# Patient Record
Sex: MALE | Race: BLACK OR AFRICAN AMERICAN | Employment: OTHER | URBAN - METROPOLITAN AREA
[De-identification: names, ages, dates, MRNs, and addresses within clinical notes are randomized per-mention and may not be internally consistent; named-entity substitution may affect disease eponyms.]

---

## 2018-06-19 RX ORDER — TRIAMTERENE/HYDROCHLOROTHIAZID 37.5-25 MG
1 TABLET ORAL DAILY
COMMUNITY

## 2018-06-19 RX ORDER — LANOLIN ALCOHOL/MO/W.PET/CERES
1000 CREAM (GRAM) TOPICAL DAILY
COMMUNITY

## 2018-06-19 RX ORDER — AMLODIPINE BESYLATE 5 MG/1
5 TABLET ORAL DAILY
COMMUNITY

## 2018-06-19 RX ORDER — ACETAMINOPHEN 500 MG
2000 TABLET ORAL DAILY
COMMUNITY

## 2018-06-19 RX ORDER — METFORMIN HYDROCHLORIDE 500 MG/1
1000 TABLET, EXTENDED RELEASE ORAL 2 TIMES DAILY
COMMUNITY

## 2018-06-19 RX ORDER — LISINOPRIL 20 MG/1
20 TABLET ORAL DAILY
COMMUNITY

## 2018-06-20 ENCOUNTER — ANESTHESIA (OUTPATIENT)
Dept: ENDOSCOPY | Age: 68
End: 2018-06-20
Payer: MEDICARE

## 2018-06-20 ENCOUNTER — HOSPITAL ENCOUNTER (OUTPATIENT)
Age: 68
Setting detail: OUTPATIENT SURGERY
Discharge: HOME | End: 2018-06-20
Attending: INTERNAL MEDICINE | Admitting: INTERNAL MEDICINE
Payer: MEDICARE

## 2018-06-20 ENCOUNTER — ANESTHESIA EVENT (OUTPATIENT)
Dept: ENDOSCOPY | Age: 68
End: 2018-06-20
Payer: MEDICARE

## 2018-06-20 VITALS
SYSTOLIC BLOOD PRESSURE: 140 MMHG | OXYGEN SATURATION: 99 % | HEART RATE: 63 BPM | RESPIRATION RATE: 13 BRPM | DIASTOLIC BLOOD PRESSURE: 72 MMHG | TEMPERATURE: 97.6 F

## 2018-06-20 PROCEDURE — 76040000019: Performed by: INTERNAL MEDICINE

## 2018-06-20 PROCEDURE — 77030009426 HC FCPS BIOP ENDOSC BSC -B: Performed by: INTERNAL MEDICINE

## 2018-06-20 PROCEDURE — 76060000031 HC ANESTHESIA FIRST 0.5 HR: Performed by: INTERNAL MEDICINE

## 2018-06-20 PROCEDURE — 77030011640 HC PAD GRND REM COVD -A: Performed by: INTERNAL MEDICINE

## 2018-06-20 PROCEDURE — 74011250636 HC RX REV CODE- 250/636

## 2018-06-20 PROCEDURE — 88305 TISSUE EXAM BY PATHOLOGIST: CPT | Performed by: INTERNAL MEDICINE

## 2018-06-20 PROCEDURE — 77030027957 HC TBNG IRR ENDOGTR BUSS -B: Performed by: INTERNAL MEDICINE

## 2018-06-20 PROCEDURE — 74011000250 HC RX REV CODE- 250

## 2018-06-20 RX ORDER — SODIUM CHLORIDE 9 MG/ML
100 INJECTION, SOLUTION INTRAVENOUS CONTINUOUS
Status: DISCONTINUED | OUTPATIENT
Start: 2018-06-20 | End: 2018-06-20 | Stop reason: HOSPADM

## 2018-06-20 RX ORDER — SODIUM CHLORIDE 0.9 % (FLUSH) 0.9 %
5-10 SYRINGE (ML) INJECTION AS NEEDED
Status: DISCONTINUED | OUTPATIENT
Start: 2018-06-20 | End: 2018-06-20 | Stop reason: HOSPADM

## 2018-06-20 RX ORDER — NALOXONE HYDROCHLORIDE 0.4 MG/ML
0.4 INJECTION, SOLUTION INTRAMUSCULAR; INTRAVENOUS; SUBCUTANEOUS
Status: DISCONTINUED | OUTPATIENT
Start: 2018-06-20 | End: 2018-06-20 | Stop reason: HOSPADM

## 2018-06-20 RX ORDER — PROPOFOL 10 MG/ML
INJECTION, EMULSION INTRAVENOUS AS NEEDED
Status: DISCONTINUED | OUTPATIENT
Start: 2018-06-20 | End: 2018-06-20 | Stop reason: HOSPADM

## 2018-06-20 RX ORDER — FLUMAZENIL 0.1 MG/ML
0.2 INJECTION INTRAVENOUS
Status: DISCONTINUED | OUTPATIENT
Start: 2018-06-20 | End: 2018-06-20 | Stop reason: HOSPADM

## 2018-06-20 RX ORDER — ATROPINE SULFATE 0.1 MG/ML
0.5 INJECTION INTRAVENOUS
Status: DISCONTINUED | OUTPATIENT
Start: 2018-06-20 | End: 2018-06-20 | Stop reason: HOSPADM

## 2018-06-20 RX ORDER — MIDAZOLAM HYDROCHLORIDE 1 MG/ML
.25-1 INJECTION, SOLUTION INTRAMUSCULAR; INTRAVENOUS
Status: DISCONTINUED | OUTPATIENT
Start: 2018-06-20 | End: 2018-06-20 | Stop reason: HOSPADM

## 2018-06-20 RX ORDER — SODIUM CHLORIDE 0.9 % (FLUSH) 0.9 %
5-10 SYRINGE (ML) INJECTION EVERY 8 HOURS
Status: DISCONTINUED | OUTPATIENT
Start: 2018-06-20 | End: 2018-06-20 | Stop reason: HOSPADM

## 2018-06-20 RX ORDER — FENTANYL CITRATE 50 UG/ML
200 INJECTION, SOLUTION INTRAMUSCULAR; INTRAVENOUS
Status: DISCONTINUED | OUTPATIENT
Start: 2018-06-20 | End: 2018-06-20 | Stop reason: HOSPADM

## 2018-06-20 RX ORDER — EPINEPHRINE 0.1 MG/ML
1 INJECTION INTRACARDIAC; INTRAVENOUS
Status: DISCONTINUED | OUTPATIENT
Start: 2018-06-20 | End: 2018-06-20 | Stop reason: HOSPADM

## 2018-06-20 RX ORDER — LIDOCAINE HYDROCHLORIDE 20 MG/ML
INJECTION, SOLUTION EPIDURAL; INFILTRATION; INTRACAUDAL; PERINEURAL AS NEEDED
Status: DISCONTINUED | OUTPATIENT
Start: 2018-06-20 | End: 2018-06-20 | Stop reason: HOSPADM

## 2018-06-20 RX ORDER — DEXTROMETHORPHAN/PSEUDOEPHED 2.5-7.5/.8
1.2 DROPS ORAL
Status: DISCONTINUED | OUTPATIENT
Start: 2018-06-20 | End: 2018-06-20 | Stop reason: HOSPADM

## 2018-06-20 RX ORDER — SODIUM CHLORIDE 9 MG/ML
INJECTION, SOLUTION INTRAVENOUS
Status: DISCONTINUED | OUTPATIENT
Start: 2018-06-20 | End: 2018-06-20 | Stop reason: HOSPADM

## 2018-06-20 RX ADMIN — SODIUM CHLORIDE: 9 INJECTION, SOLUTION INTRAVENOUS at 08:40

## 2018-06-20 RX ADMIN — PROPOFOL 50 MG: 10 INJECTION, EMULSION INTRAVENOUS at 08:48

## 2018-06-20 RX ADMIN — PROPOFOL 50 MG: 10 INJECTION, EMULSION INTRAVENOUS at 08:52

## 2018-06-20 RX ADMIN — PROPOFOL 50 MG: 10 INJECTION, EMULSION INTRAVENOUS at 08:46

## 2018-06-20 RX ADMIN — PROPOFOL 50 MG: 10 INJECTION, EMULSION INTRAVENOUS at 08:44

## 2018-06-20 RX ADMIN — LIDOCAINE HYDROCHLORIDE 40 MG: 20 INJECTION, SOLUTION EPIDURAL; INFILTRATION; INTRACAUDAL; PERINEURAL at 08:44

## 2018-06-20 RX ADMIN — PROPOFOL 50 MG: 10 INJECTION, EMULSION INTRAVENOUS at 08:55

## 2018-06-20 RX ADMIN — PROPOFOL 50 MG: 10 INJECTION, EMULSION INTRAVENOUS at 08:50

## 2018-06-20 NOTE — PROCEDURES
[address], [address] [place] [address]      Colonoscopy Operative Report    Ariane Kilpatrick  [id number]  1950      Procedure Type:   Colonoscopy with polypectomy (hot biopsy)     Indications:    Screening colonoscopy   Date of last colonoscopy: 10 years, Polyps  No    Pre-operative Diagnosis: see indication above    Post-operative Diagnosis:  See findings below    :  Mitzi Severs, MD      Referring Provider: Eric Davila MD      Sedation:  MAC anesthesia Propofol      Procedure Details:  After informed consent was obtained with all risks and benefits of procedure explained and preoperative exam completed, the patient was taken to the endoscopy suite and placed in the left lateral decubitus position. Upon sequential sedation as per above, a digital rectal exam was performed demonstrating internal hemorrhoids. The Olympus videocolonoscope  was inserted in the rectum and carefully advanced to the cecum, which was identified by the ileocecal valve and appendiceal orifice. The cecum was identified by the ileocecal valve and appendiceal orifice. The quality of preparation was good. The colonoscope was slowly withdrawn with careful evaluation between folds. Retroflexion in the rectum was completed . Findings:   Rectum: normal  Sigmoid: 9 mm polyp removed by hot biopsy  Descending Colon: normal  Transverse Colon: 9 mm polyp removed by hot biopsy  Ascending Colon: 9 mm polyp removed by hot biopsy  Cecum: normal        Specimen Removed:  see findings    Complications: None. EBL:  None. Impression:    see findings    Recommendations: --Await pathology.       Recommendation for next colonscopy in 3 years      Signed By: Mitzi Severs, MD     6/20/2018  9:01 AM

## 2018-06-20 NOTE — ROUTINE PROCESS
Hebert Heri  1950  [id number]    Situation:  Verbal report received from: [facility]  Procedure: Procedure(s):  COLONOSCOPY  ENDOSCOPIC POLYPECTOMY    Background:    Preoperative diagnosis: SCREENING  Postoperative diagnosis: 1. Colonic Polyps    :  Dr. Rodolfo Garrett  Assistant(s): Endoscopy Technician-1: Ottie Sandifer  Endoscopy RN-1: Nica Fernandez RN    Specimens:   ID Type Source Tests Collected by Time Destination   1 : Ascending Colon Polyp Preservative   Sonia Gilbert MD 6/20/2018 8666 Pathology   2 : Transverse Colon Polyp Preservative   Sonia Gilbert MD 6/20/2018 7155 Pathology   3 : Sigmoid Colon Polyp Preservative   Dexter Calles MD 6/20/2018 6564 Pathology     H. Pylori  no    Assessment:  Intra-procedure medications   Anesthesia gave intra-procedure sedation and medications, see anesthesia flow sheet yes    Intravenous fluids: NS@ KVO     Vital signs stable     Abdominal assessment: round and soft     Recommendation:  Discharge patient per MD order.   Family or Friend Friend  Permission to share finding with family or friend yes

## 2018-06-20 NOTE — ANESTHESIA PREPROCEDURE EVALUATION
Anesthetic History   No history of anesthetic complications            Review of Systems / Medical History  Patient summary reviewed, nursing notes reviewed and pertinent labs reviewed    Pulmonary  Within defined limits                 Neuro/Psych   Within defined limits           Cardiovascular    Hypertension: well controlled                   GI/Hepatic/Renal  Within defined limits              Endo/Other    Diabetes: type 2         Other Findings            Physical Exam    Airway  Mallampati: II  TM Distance: > 6 cm  Neck ROM: normal range of motion   Mouth opening: Normal     Cardiovascular  Regular rate and rhythm,  S1 and S2 normal,  no murmur, click, rub, or gallop             Dental  No notable dental hx       Pulmonary  Breath sounds clear to auscultation               Abdominal  GI exam deferred       Other Findings            Anesthetic Plan    ASA: 2  Anesthesia type: MAC          Induction: Intravenous  Anesthetic plan and risks discussed with: Patient

## 2018-06-20 NOTE — ANESTHESIA POSTPROCEDURE EVALUATION
Post-Anesthesia Evaluation and Assessment    Patient: Mor Cortez MRN: [id number]  SSN: [id number]    YOB: 1950  Age: 77 y.o. Sex: male       Cardiovascular Function/Vital Signs  Visit Vitals    /72    Pulse 63    Temp 36.4 °C (97.6 °F)    Resp 13    SpO2 99%       Patient is status post MAC anesthesia for Procedure(s):  COLONOSCOPY  ENDOSCOPIC POLYPECTOMY. Nausea/Vomiting: None    Postoperative hydration reviewed and adequate. Pain:  Pain Scale 1: Numeric (0 - 10) (06/20/18 0956)  Pain Intensity 1: 0 (06/20/18 0956)   Managed    Neurological Status: At baseline    Mental Status and Level of Consciousness: Arousable    Pulmonary Status:   O2 Device: Room air (06/20/18 0956)   Adequate oxygenation and airway patent    Complications related to anesthesia: None    Post-anesthesia assessment completed.  No concerns    Signed By: Martínez Esparza MD     June 20, 2018

## 2018-06-20 NOTE — IP AVS SNAPSHOT
[address] 
[phone] Patient: Joanna Avilez MRN: [id number] BES:9/59/5014 About your hospitalization You were admitted on:  June 20, 2018 You last received care in the:  [facility] ENDOSCOPY You were discharged on:  June 20, 2018 Why you were hospitalized Your primary diagnosis was:  Not on File Follow-up Information None Discharge Orders None A check parish indicates which time of day the medication should be taken. My Medications CONTINUE taking these medications Instructions Each Dose to Equal  
 Morning Noon Evening Bedtime  
 amLODIPine 5 mg tablet Commonly known as:  Freeman Bars Your last dose was: Your next dose is: Take 5 mg by mouth daily. 5 mg ASPIRIN PO Your last dose was: Your next dose is: Take 81 mg by mouth daily. 81 mg Cholecalciferol (Vitamin D3) 2,000 unit Cap capsule Commonly known as:  VITAMIN D3 Your last dose was: Your next dose is: Take 2,000 Units by mouth daily. 2000 Units FLOMAX 0.4 mg capsule Generic drug:  tamsulosin Your last dose was: Your next dose is: Take 0.4 mg by mouth daily. 0.4 mg  
    
   
   
   
  
 glipiZIDE 5 mg tablet Commonly known as:  Gutierrez Half Your last dose was: Your next dose is: Take 5 mg by mouth two (2) times a day. 5 mg  
    
   
   
   
  
 glucophage  mg tablet Generic drug:  metFORMIN ER Your last dose was: Your next dose is: Take 1,000 mg by mouth two (2) times a day. 1000 mg  
    
   
   
   
  
 labetalol 300 mg tablet Commonly known as:  Payal Carlos Your last dose was: Your next dose is: Take 900 mg by mouth two (2) times a day. 900 mg  
    
   
   
   
  
 lisinopril 20 mg tablet Commonly known as:  Joshua Faustina Your last dose was: Your next dose is: Take 20 mg by mouth daily. 20 mg  
    
   
   
   
  
 lovastatin 20 mg tablet Commonly known as:  MEVACOR Your last dose was: Your next dose is: Take 20 mg by mouth nightly. 20 mg NEXIUM PO Your last dose was: Your next dose is: Take 22.5 mg by mouth daily. 22.5 mg  
    
   
   
   
  
 POTASSIUM CHLORIDE PO Your last dose was: Your next dose is: Take 20 mEq by mouth daily. ER micro  
 20 mEq  
    
   
   
   
  
 triamterene-hydroCHLOROthiazide 37.5-25 mg per tablet Commonly known as:  Adeline Marus Your last dose was: Your next dose is: Take 1 Tab by mouth daily. 1 Tab VITAMIN B-12 1,000 mcg tablet Generic drug:  cyanocobalamin Your last dose was: Your next dose is: Take 1,000 mcg by mouth daily. 1000 mcg Discharge Instructions [address] 
611 [facility], [address] [place] [address] COLON DISCHARGE INSTRUCTIONS César Reynoso [id number] 
1950 Discomfort: 
Redness at IV site- apply warm compress to area; if redness or soreness persist- contact your physician There may be a slight amount of blood passed from the rectum Gaseous discomfort- walking, belching will help relieve any discomfort You may not operate a vehicle for 12 hours You may not engage in an occupation involving machinery or appliances for rest of today You may not drink alcoholic beverages for at least 12 hours Avoid making any critical decisions for at least 24 hour DIET: 
You may resume your regular diet  however -  remember your colon is empty and a heavy meal will produce gas. Avoid these foods:  vegetables, fried / greasy foods, carbonated drinks ACTIVITY: 
You may  resume your normal daily activities it is recommended that you spend the remainder of the day resting -  avoid any strenuous activity. CALL M.D. ANY SIGN OF: Increasing pain, nausea, vomiting Abdominal distension (swelling) New increased bleeding (oral or rectal) Fever (chills) Pain in chest area Bloody discharge from nose or mouth Shortness of breath Follow-up Instructions: 
 Call Dr. Donovan Laureano for any questions or problems at [phone] ENDOSCOPY FINDINGS: 
 Your colonoscopy showed 3 small polyps I removed. Telephone # [phone] Signed By: Donovan Laureano MD   
 6/20/2018  9:04 AM 
  
 
DISCHARGE SUMMARY from Nurse The following personal items collected during your admission are returned to you:  
Dental Appliance: Dental Appliances: None Vision: Visual Aid: Glasses Hearing Aid:   
Jewelry:   
Clothing:   
Other Valuables:   
Valuables sent to safe:   
 
 
 
  
  
  
Introducing [facility]! [facility] introduces Bangbite patient portal. Now you can access parts of your medical record, email your doctor's office, and request medication refills online. 1. In your internet browser, go to [email or web address]. [employer] 2. Click on the First Time User? Click Here link in the Sign In box. You will see the New Member Sign Up page. 3. Enter your Bangbite Access Code exactly as it appears below. You will not need to use this code after youve completed the sign-up process. If you do not sign up before the expiration date, you must request a new code. · Bangbite Access Code: [email or web address] Expires: 9/18/2018  9:11 AM 
 
4. Enter the last four digits of your Social Security Number (xxxx) and Date of Birth (mm/dd/yyyy) as indicated and click Submit. You will be taken to the next sign-up page. 5. Create a G[employer]x ID. This will be your [employer] login ID and cannot be changed, so think of one that is secure and easy to remember. 6. Create a [employer] password. You can change your password at any time. 7. Enter your Password Reset Question and Answer. This can be used at a later time if you forget your password. 8. Enter your e-mail address. You will receive e-mail notification when new information is available in [address] 9. Click Sign Up. You can now view and download portions of your medical record. 10. Click the Download Summary menu link to download a portable copy of your medical information. If you have questions, please visit the Frequently Asked Questions section of the [employer] website. Remember, [employer]x is NOT to be used for urgent needs. For medical emergencies, dial 911. Now available from your iPhone and Android! Introducing Moy Wade As a [facility] patient, I wanted to make you aware of our electronic visit tool called Moy Wade. [facility] 24/7 allows you to connect within minutes with a medical provider 24 hours a day, seven days a week via a mobile device or tablet or logging into a secure website from your computer. You can access Moy Wade from anywhere in the [place]. A virtual visit might be right for you when you have a simple condition and feel like you just dont want to get out of bed, or cant get away from work for an appointment, when your regular [facility] provider is not available (evenings, weekends or holidays), or when youre out of town and need minor care. Electronic visits cost only $49 and if the [facility] 24/7 provider determines a prescription is needed to treat your condition, one can be electronically transmitted to a nearby pharmacy*. Please take a moment to enroll today if you have not already done so. The enrollment process is free and takes just a few minutes.   To enroll, please download the [facility] 24/7 cordell to your tablet or phone, or visit [email or web address] to enroll on your computer. And, as an [address] patient with a elmenus account, the results of your visits will be scanned into your electronic medical record and your primary care provider will be able to view the scanned results. We urge you to continue to see your regular [employer] provider for your ongoing medical care. And while your primary care provider may not be the one available when you seek a [employer]In[employer] virtual visit, the peace of mind you get from getting a real diagnosis real time can be priceless. For more information on PowerInbox, view our Frequently Asked Questions (FAQs) at [email or web address]. Sincerely, 
 
Sheba Chowdary MD 
Chief Medical Officer [facility] Trudi Ritchie *:  certain medications cannot be prescribed via PowerInbox Providers Seen During Your Hospitalization Provider Specialty Primary office phone Bekah Aviles MD Gastroenterology [phone] Your Primary Care Physician (PCP) Primary Care Physician Office Phone Office Fax Felipa HEBERT [phone] You are allergic to the following No active allergies Recent Documentation Smoking Status Former Smoker Emergency Contacts Name Discharge Info Relation Home Work Mobile Cristobal Sam DISCHARGE CAREGIVER [3] Other Relative [6] [phone] Patient Belongings The following personal items are in your possession at time of discharge: 
  Dental Appliances: None  Visual Aid: Glasses Please provide this summary of care documentation to your next provider. Signatures-by signing, you are acknowledging that this After Visit Summary has been reviewed with you and you have received a copy. Patient Signature:  ____________________________________________________________ Date:  ____________________________________________________________  
  
Arna [place] Provider Signature:  ____________________________________________________________ Date:  ____________________________________________________________

## 2018-06-20 NOTE — H&P
[address]  174 [facility], [address] [place] [address]      History and Physical       NAME:  Dionne Zaragoza   :   1950   MRN:   [id number]             History of Present Illness:  Patient is a 77 y.o. who is seen for screening colonoscopy. 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 Taking? ASPIRIN PO 2018 at Unknown time  Yes Yes   Sig: Take 81 mg by mouth daily. Cholecalciferol, Vitamin D3, (VITAMIN D3) 2,000 unit cap capsule 2018 at Unknown time  Yes Yes   Sig: Take 2,000 Units by mouth daily. POTASSIUM CHLORIDE PO 2018 at Unknown time  Yes Yes   Sig: Take 20 mEq by mouth daily. ER micro   amLODIPine (NORVASC) 5 mg tablet 2018 at Unknown time  Yes Yes   Sig: Take 5 mg by mouth daily. cyanocobalamin (VITAMIN B-12) 1,000 mcg tablet 2018 at Unknown time  Yes Yes   Sig: Take 1,000 mcg by mouth daily. esomeprazole magnesium (NEXIUM PO) 2018 at Unknown time  Yes Yes   Sig: Take 22.5 mg by mouth daily. glipiZIDE (GLUCOTROL) 5 mg tablet 2018 at Unknown time Self Yes Yes   Sig: Take 5 mg by mouth two (2) times a day. labetalol (NORMODYNE) 300 mg tablet 2018 at Unknown time Self Yes Yes   Sig: Take 900 mg by mouth two (2) times a day. lisinopril (PRINIVIL, ZESTRIL) 20 mg tablet 2018 at Unknown time  Yes Yes   Sig: Take 20 mg by mouth daily. lovastatin (MEVACOR) 20 mg tablet 2018 at Unknown time  Yes Yes   Sig: Take 20 mg by mouth nightly. metFORMIN ER (GLUCOPHAGE XR) 500 mg tablet 2018 at Unknown time  Yes Yes   Sig: Take 1,000 mg by mouth two (2) times a day.    tamsulosin (FLOMAX) 0.4 mg capsule 6/19/2018 at Unknown time  Yes Yes   Sig: Take 0.4 mg by mouth daily. triamterene-hydroCHLOROthiazide (MAXZIDE) 37.5-25 mg per tablet 6/20/2018 at Unknown time  Yes Yes   Sig: Take 1 Tab by mouth daily.       Facility-Administered Medications: None       Hospital Medications:  Current Facility-Administered Medications   Medication Dose Route Frequency    0.9% sodium chloride infusion  100 mL/hr IntraVENous CONTINUOUS    sodium chloride (NS) flush 5-10 mL  5-10 mL IntraVENous Q8H    sodium chloride (NS) flush 5-10 mL  5-10 mL IntraVENous PRN    midazolam (VERSED) injection 0.25-10 mg  0.25-10 mg IntraVENous Multiple    fentaNYL citrate (PF) injection 200 mcg  200 mcg IntraVENous Multiple    naloxone (NARCAN) injection 0.4 mg  0.4 mg IntraVENous Multiple    flumazenil (ROMAZICON) 0.1 mg/mL injection 0.2 mg  0.2 mg IntraVENous Multiple    simethicone (MYLICON) 92PP/8.2TP oral drops 80 mg  1.2 mL Oral Multiple    atropine injection 0.5 mg  0.5 mg IntraVENous ONCE PRN    EPINEPHrine (ADRENALIN) 0.1 mg/mL syringe 1 mg  1 mg Endoscopically ONCE PRN       Social History:  Social History   Substance Use Topics    Smoking status: Former Smoker    Smokeless tobacco: Never Used      Comment: quit 1981    Alcohol use No       Family History:  Family History   Problem Relation Age of Onset    Cancer Mother      uterine    Heart Disease Maternal Grandmother              Review of Systems:      Constitutional: negative fever, negative chills, negative weight loss  Eyes:   negative visual changes  ENT:   negative sore throat, tongue or lip swelling  Respiratory:  negative cough, negative dyspnea  Cards:  negative for chest pain, palpitations, lower extremity edema  GI:   See HPI  :  negative for frequency, dysuria  Integument:  negative for rash and pruritus  Heme:  negative for easy bruising and gum/nose bleeding  Musculoskel: negative for myalgias,  back pain and muscle weakness  Neuro: negative for headaches, dizziness, vertigo  Psych:  negative for feelings of anxiety, depression       Objective:   Patient Vitals for the past 8 hrs:   BP Temp Pulse Resp   06/20/18 0820 143/73 97.6 °F (36.4 °C) 67 15             EXAM:     NEURO-a&o   HEENT-wnl   LUNGS-clear    COR-regular rate and rhythym     ABD-soft , no tenderness, no rebound, good bs     EXT-no edema     Data Review     No results for input(s): WBC, HGB, HCT, PLT, HGBEXT, HCTEXT, PLTEXT in the last 72 hours. No results for input(s): NA, K, CL, CO2, BUN, CREA, GLU, PHOS, CA in the last 72 hours. No results for input(s): SGOT, GPT, AP, TBIL, TP, ALB, GLOB, GGT, AML, LPSE in the last 72 hours. No lab exists for component: AMYP, HLPSE  No results for input(s): INR, PTP, APTT in the last 72 hours. No lab exists for component: INREXT       Assessment:   · Screening colonoscopy   There is no problem list on file for this patient.         Plan:   · Endoscopic procedure with sedation     Signed By: Nj Pugh MD     6/20/2018  8:33 AM

## 2018-06-20 NOTE — DISCHARGE INSTRUCTIONS
[facility]    COLON DISCHARGE INSTRUCTIONS    Luzmaria Ny  [id number]  1950    Discomfort:  Redness at IV site- apply warm compress to area; if redness or soreness persist- contact your physician  There may be a slight amount of blood passed from the rectum  Gaseous discomfort- walking, belching will help relieve any discomfort  You may not operate a vehicle for 12 hours  You may not engage in an occupation involving machinery or appliances for rest of today  You may not drink alcoholic beverages for at least 12 hours  Avoid making any critical decisions for at least 24 hour  DIET:  You may resume your regular diet - however -  remember your colon is empty and a heavy meal will produce gas. Avoid these foods:  vegetables, fried / greasy foods, carbonated drinks     ACTIVITY:  You may  resume your normal daily activities it is recommended that you spend the remainder of the day resting -  avoid any strenuous activity. CALL M.D. ANY SIGN OF:   Increasing pain, nausea, vomiting  Abdominal distension (swelling)  New increased bleeding (oral or rectal)  Fever (chills)  Pain in chest area  Bloody discharge from nose or mouth  Shortness of breath      Follow-up Instructions:   Call Dr. Mars Diaz for any questions or problems at [address] [place] [address] St:   Your colonoscopy showed 3 small polyps I removed.   Telephone # [phone]      Signed By: Mars Diaz MD     6/20/2018  9:04 AM       DISCHARGE SUMMARY from Nurse    The following personal items collected during your admission are returned to you:   Dental Appliance: Dental Appliances: None  Vision: Visual Aid: Glasses  Hearing Aid:    Jewelry:    Clothing:    Other Valuables:    Valuables sent to safe:

## 2018-06-20 NOTE — PROGRESS NOTES

## 2018-07-02 ENCOUNTER — HOSPITAL ENCOUNTER (OUTPATIENT)
Dept: PHYSICAL THERAPY | Age: 68
Discharge: HOME | End: 2018-07-02
Payer: MEDICARE

## 2018-07-02 PROCEDURE — 97110 THERAPEUTIC EXERCISES: CPT

## 2018-07-02 PROCEDURE — 97161 PT EVAL LOW COMPLEX 20 MIN: CPT

## 2018-07-02 NOTE — PROGRESS NOTES
[facility]  [address], [address] [facility]    OUTPATIENT physical Therapy  [x]      Initial Evaluation []     30 day/10th visit progress note []     90 day/re-certification    NAME: Aleyda Durham AGE: 77 y.o. GENDER: male  DATE: 7/2/2018  REFERRING PHYSICIAN: Jenna Love MD    OBJECTIVE DATA SUMMARY:   Medical Diagnosis: lumbar radiculopathy  PT Diagnosis: Pain affecting function, decreased ROM  Date of Onset: 3 weeks ago  Mechanism of Injury/Chief Complaint:  3 years ago similar injury but gradually got better. 3 weeks ago flare up. [facility], drove back and woke up with pain. Present Symptoms: Pt reports pain in right foot, right calf (grabing) back of thigh, into back.   Functional Deficits and Limitations:   []     Sitting:   []    Dressing:   []    Reaching:  [x]     Standing:   []     Bathing:   []    Lifting:  [x]     Walking:   []     Cooking:   []    Yardwork:  [x]     Sleeping:   []     Cleaning:   []     Driving:  []     Work Tasks:  []     Recreation:   []    Other:    HISTORY:  Past Medical History:   Past Medical History:   Diagnosis Date    Diabetes ([facility] [address]     High cholesterol     Hypertension      Past Surgical History:   Procedure Laterality Date    COLONOSCOPY N/A 6/20/2018    COLONOSCOPY performed by Natan Garcia MD at [facility]      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     Precautions: none  Current Medications:  Percocet 1/2  Prior Level of Function/Home Situation: Independent  Personal factors and/or comorbidities impacting plan of care: none  Social/Work History:  Retired  Previous Therapy:  none    SUBJECTIVE:   Pt reports grabbing pain in right low back, right thigh, calf and right foot  Patients goals for therapy: feel better    OBJECTIVE DATA SUMMARY:   EXAMINATION/PRESENTATION/DECISION MAKING:   Pain:  Location:  Quality: aching  Now: 6/10  Best:  Worst:  Factors that improve pain: rest sitting    Posture: WNL    Strength:   Right knee extension 3+/5, left knee ext 5/5    Range of Motion:   Lumbar   Ext 25 % of normal into left quadrant  Right side bending  25% of naormalpainful    Spinal Assessment:   Flattened lumbar spine      Joint Mobility:   Hypomobile lumbar spinal segments with P-A pressure    Palpation:   TTP right quadratus, lumbar paraspinals, gluteals, piriformis, gastroc    Neurologic Assessment:   Tone: WNL   Sensation: WNL   Reflexes: not tested, test next visit    Special Tests:   - SLR  + slump test on right    Mobility:   Transitional Movements: Antalgic   Gait: WNL    Balance: WNL    Functional Measure: In compliance with CMSs Claims Based Outcome Reporting, the following G-code set was chosen for this patient based on their primary functional limitation being treated: The outcome measure chosen to determine the severity of the functional limitation was the TXU Felipe with a score of 11/24 which was correlated with the impairment scale.     ? Mobility - Walking and Moving Around:     - CURRENT STATUS: CK - 40%-59% impaired, limited or restricted    - GOAL STATUS: CI - 1%-19% impaired, limited or restricted    - D/C STATUS:  ---------------To be determined---------------      Physical Therapy Evaluation Charge Determination   History Examination Presentation Decision-Making   LOW Complexity : Zero comorbidities / personal factors that will impact the outcome / POC LOW Complexity : 1-2 Standardized tests and measures addressing body structure, function, activity limitation and / or participation in recreation  LOW Complexity : Stable, uncomplicated  LOW Complexity : FOTO score of       Based on the above components, the patient evaluation is determined to be of the following complexity level: LOW     TREATMENT/INTERVENTION:  Modalities (Rationale):   MHP post treatment to decrease pain and muscle guarding    Therapeutic Exercises:  HEP   LTR, KTC, Bridges, HS stretch,  gastroc stretch      Manual Therapy:  None this date    Neuro Re-Education:  Discussed sitting with lumbar support to improve postural alignment    Balance Training:  none    Ambulation/Gait Training:  none    Activity tolerance and post treatment pain report: Tolerated well  Education:  []     Home exercise program provided. Education was provided to the patient on the following topics: importance of exercises. Patient verbalized understanding of the topics presented. ASSESSMENT:   Cb Tolentino is a 77 y.o. male who presents with right low back, hip and lower extremity pain. Physical therapy problems based on objective data include: pain affecting function, decrease ROM and decrease ADL/ functional abilitiies . Patient will benefit from skilled intervention to address these impairments. Rehabilitation potential is considered to be Excellent. Factors which may influence rehabilitation potential include deconditioning . Patient will benefit from 12 physical therapy visits over 6 weeks to optimize improvement in these areas. PLAN OF CARE:   Recommendations and Planned Interventions:  []     Therapeutic Activities  [x]     Heat/Ice  [x]     Therapeutic Exercises  []     Ultrasound  []     Gait training  [x]     E-stim  []     Balance training  [x]     Home exercise program  [x]     Manual Therapy  [x]     TENS  [x]     Neuro Re-Ed  []     Edema management  [x]     Posture/Biomechanics  [x]     Pain management  [x]     Traction  []     Other:    Frequency/Duration:  Patient will be followed by physical therapy 2 times a week for  6 weeks to address goals. GOALS  Short term goals  Time frame: 3 weeks  1. Patient will be compliance and independent with the initial HEP as evidenced by being able to perform without cuing. 2. Patient will report a 50% improvement in symptoms. 3. Patient report a 50% improvement in sleeping.   4. Patient will tolerate 15 minutes of clinic activities before onset of symptoms. Long term goals  Time frame: 6 weeks  1. Patient will reports pain level decreased to 2/10 to allow increased ease of movement. 2. Patient will have an improved score on the TXU Felipe outcome measure by 10 points to demonstrate an increase in functional activity tolerance. 3. Patient will be independent in final individualized HEP. 4. Patient will return to walking without being limited by symptoms. 5. Patient will sleep 6-8 hours without being interrupted by pain. [x]     Patient has participated in goal setting and agrees to work toward plan of care. []     Patient was instructed to call if questions or concerns arise. Thank you for this referral.  Ronnie Dennis, PT   Time Calculation: 55 mins    Patient Time in clinic:   Start Time: 1330   Stop Time: 7143    TREATMENT PLAN EFFECTIVE DATES:   7/2/2018 TO 9/3/2018  I have read the above plan of care for Aimee Brittle and certify the need for skilled physical therapy services.     Physician Signature: ____________________________________________________    Date: _________________________________________________________________

## 2018-07-09 ENCOUNTER — HOSPITAL ENCOUNTER (OUTPATIENT)
Dept: PHYSICAL THERAPY | Age: 68
Discharge: HOME | End: 2018-07-09
Payer: MEDICARE

## 2018-07-09 PROCEDURE — 97140 MANUAL THERAPY 1/> REGIONS: CPT

## 2018-07-09 PROCEDURE — 97110 THERAPEUTIC EXERCISES: CPT

## 2018-07-09 NOTE — PROGRESS NOTES
[address] OUTPATIENT physical Therapy DAILY Treatment NOTe Visit: 2 NAME: Zain Cm AGE: 77 y.o. GENDER: male DATE: 7/9/2018 REFERRING PHYSICIAN: Lulu Khalil MD 
 
 
 
GOALS Short term goals Time frame: 3 weeks 1. Patient will be compliance and independent with the initial HEP as evidenced by being able to perform without cuing. 2. Patient will report a 50% improvement in symptoms. 3. Patient report a 50% improvement in sleeping. 4. Patient will tolerate 15 minutes of clinic activities before onset of symptoms. Long term goals Time frame: 6 weeks 1. Patient will reports pain level decreased to 2/10 to allow increased ease of movement. 2. Patient will have an improved score on the TXU Felipe outcome measure by 10 points to demonstrate an increase in functional activity tolerance. 3. Patient will be independent in final individualized HEP. 4. Patient will return to walking without being limited by symptoms. 5. Patient will sleep 6-8 hours without being interrupted by pain. SUBJECTIVE:  
I feel a little better Pain In: 5/10 OBJECTIVE DATA SUMMARY:  
 
Pain: 
Location: 
Quality: aching Now: 6/10 Best: Worst: 
Factors that improve pain: rest sitting Posture: WNL Strength:  
Right knee extension 3+/5, left knee ext 5/5 Range of Motion:  
Lumbar Ext 25 % of normal into left quadrant Right side bending  25% of naormalpainful Spinal Assessment:  
Flattened lumbar spine Joint Mobility: Hypomobile lumbar spinal segments with P-A pressure Palpation:  
TTP right quadratus, lumbar paraspinals, gluteals, piriformis, gastroc Neurologic Assessment: 
 Tone: WNL Sensation: WNL Reflexes: not tested, test next visit Special Tests:  
- SLR 
+ slump test on right Mobility: 
 Transitional Movements: Antalgic Gait: WNL Balance: WNL Functional Measure: In compliance with CMSs Claims Based Outcome Reporting, the following G-code set was chosen for this patient based on their primary functional limitation being treated: The outcome measure chosen to determine the severity of the functional limitation was the TXU Felipe with a score of 11/24 which was correlated with the impairment scale. ? Mobility - Walking and Moving Around:  
  - CURRENT STATUS: CK - 40%-59% impaired, limited or restricted  - GOAL STATUS: CI - 1%-19% impaired, limited or restricted  - D/C STATUS:  ---------------To be determined--------------- Physical Therapy Evaluation Charge Determination History Examination Presentation Decision-Making LOW Complexity : Zero comorbidities / personal factors that will impact the outcome / POC LOW Complexity : 1-2 Standardized tests and measures addressing body structure, function, activity limitation and / or participation in recreation  LOW Complexity : Stable, uncomplicated  LOW Complexity : FOTO score of  Based on the above components, the patient evaluation is determined to be of the following complexity level: LOW  
 
TREATMENT/INTERVENTION: 
Modalities (Rationale): MHP post treatment to decrease pain and muscle guarding Therapeutic Exercises: HEP   LTR, KTC, Piriformis stretch supine, PPT,Bridges, HS stretch,  gastroc stretch Clinic Activities KTC  X 10 reps B/L Piriformis stretch x 10 reps LTR x 10 reps PPT 5 reps with 15 sec hold BKTC 12 reps Single leg lumbar rotation x 3 reps 20 sec hold Flexion/ext over back of chair x 7 reps Seated side bends x 7 reps Manual Therapy: STM right lumbar paraspinals L3-L5, quadratus lumborum TTP, report of pain referral down right LE with pressure in this region. Also self STM using a tennis ball, right lumbar paraspinals. TTP deep piriformis.   
 
Neuro Re-Education: 
Discussed sitting with lumbar support to improve postural alignment Balance Training: 
none Ambulation/Gait Training: 
none Activity tolerance and post treatment pain report: Tolerated well Pain Out:  
 
Education: 
Education was provided to the patient on the following topics: Importance of exercises. [x]    No changes were made to the home exercise program. 
[]    The following changes were made to the home exercise program:  
Patient verbalized understanding of the topics presented. ASSESSMENT:  
Pt returns to Therapy following IE, he reports some decrease in right low back, hip and calf pain. Reviewed HEP, good compliance but needed VC's for correct form. Added above exercises as above with good tolerance. STM right lumbar paraspinals, TTP and reproduced some right LE symptoms. Self mob of this region with tennis ball, encouraged home use of tennis ball. Some TTP with deep pressure to right piriformis. Will attempt lumbar gapping and mob of this region. Patients progression toward goals is as follows: 
[x]     Improving appropriately and progressing toward goals 
[]     Improving slowly and progressing toward goals 
[]     Not making progress toward goals and plan of care will be adjusted PLAN OF CARE:  
Patient continues to benefit from skilled intervention to address the above impairments. [x]    Continue treatment per established plan of care. []     Recommend the following changes to the plan of care:  
 
Recommendations/Intent for next treatment: LBE, lumbar gapping and mob, continued stretching and strengthening of lumbar and gluteal muscles. Domonique Chandra PT Time Calculation: 45 mins Patient Time in clinic: 
 Start Time: [phone] Stop Time: 2433

## 2018-07-11 ENCOUNTER — HOSPITAL ENCOUNTER (OUTPATIENT)
Dept: PHYSICAL THERAPY | Age: 68
Discharge: HOME | End: 2018-07-11
Payer: MEDICARE

## 2018-07-11 ENCOUNTER — OFFICE VISIT (OUTPATIENT)
Dept: NEUROLOGY | Age: 68
End: 2018-07-11

## 2018-07-11 VITALS
RESPIRATION RATE: 18 BRPM | SYSTOLIC BLOOD PRESSURE: 154 MMHG | BODY MASS INDEX: 26.82 KG/M2 | HEIGHT: 71 IN | HEART RATE: 69 BPM | OXYGEN SATURATION: 97 % | TEMPERATURE: 98.5 F | DIASTOLIC BLOOD PRESSURE: 87 MMHG | WEIGHT: 191.6 LBS

## 2018-07-11 DIAGNOSIS — M54.50 ACUTE RIGHT-SIDED LOW BACK PAIN WITHOUT SCIATICA: ICD-10-CM

## 2018-07-11 DIAGNOSIS — G62.9 NEUROPATHY: Primary | ICD-10-CM

## 2018-07-11 DIAGNOSIS — M54.16 LUMBAR RADICULOPATHY: ICD-10-CM

## 2018-07-11 DIAGNOSIS — R20.2 PARESTHESIA: ICD-10-CM

## 2018-07-11 DIAGNOSIS — G62.9 SENSORY NEUROPATHY: ICD-10-CM

## 2018-07-11 DIAGNOSIS — G56.03 BILATERAL CARPAL TUNNEL SYNDROME: ICD-10-CM

## 2018-07-11 PROCEDURE — 97110 THERAPEUTIC EXERCISES: CPT

## 2018-07-11 PROCEDURE — 97140 MANUAL THERAPY 1/> REGIONS: CPT

## 2018-07-11 RX ORDER — TRAMADOL HYDROCHLORIDE 50 MG/1
50 TABLET ORAL
Qty: 30 TAB | Refills: 1 | Status: SHIPPED | OUTPATIENT
Start: 2018-07-11 | End: 2018-07-23 | Stop reason: CLARIF

## 2018-07-11 NOTE — PATIENT INSTRUCTIONS
All test results will be discussed at the next appointment. MRI of the Head: About This Test 
What is it? MRI (magnetic resonance imaging) is a test that uses a magnetic field and pulses of radio wave energy to make pictures of the organs and structures inside the body. An MRI of the head can give your doctor information about your brain, eyes, ears, and nerves. When you have an MRI, you lie on a table and the table moves into the MRI machine. Why is this test done? An MRI of the head can help find problems such as tumors and infection. It also can check symptoms of a head injury or of diseases such as multiple sclerosis (MS) and stroke. How can you prepare for the test? 
Talk to your doctor about all your health conditions before the test. For example, tell your doctor if: 
· You are allergic to any medicines. · You are or might be pregnant. · You have a pacemaker, an artificial limb, any metal pins or metal parts in your body, metal heart valves, metal clips in your brain, metal implants in your ears, or any other implanted or prosthetic medical device. · You have an intrauterine device (IUD) in place. · You get nervous in confined spaces. You may need medicine to help you relax. · You wear a patch that contains medicine. · You have kidney disease. What happens before the test? 
· You will remove all metal objects, such as hearing aids, dentures, jewelry, watches, and hairpins. · You may need to take off some of your clothes. You will be given a gown to wear during the test. If you do leave some clothes on, make sure you take everything out of your pockets. · You may have contrast material (dye) put into your arm through a tube called an IV. Contrast material helps doctors see specific organs, blood vessels, and most tumors. What happens during the test? 
· You will lie on your back on a table that is part of the MRI scanner.  Your head, chest, and arms may be held with straps to help you lie still. 
· The table will slide into the space that contains the magnet. A device called a coil may be placed over or wrapped around your head. · Inside the scanner you will hear a fan and feel air moving. You may hear tapping, thumping, or snapping noises. You may be given earplugs or headphones to reduce the noise. · You will be asked to hold still during the scan. You may be asked to hold your breath for short periods. · You may be alone in the scanning room, but a technologist will be watching you through a window and talking with you during the test. 
What else should you know about the test? 
· An MRI does not hurt. · If a dye is used, you may feel a quick sting or pinch and some coolness when the IV is started. The dye may give you a metallic taste in your mouth. Some people feel sick to their stomach or get a headache. · If you breastfeed and are concerned about whether the dye used in this test is safe, talk to your doctor. Most experts believe that very little dye passes into breast milk and even less is passed on to the baby. But if you prefer, you can store some of your breast milk ahead of time and use it for a day or two after the test. 
· You may feel warmth in the area being examined. This is normal. 
How long does the test take? · The test usually takes 30 to 60 minutes but can take as long as 2 hours. What happens after the test? 
· You will probably be able to go home right away, depending on the reason for the test. 
· You can go back to your usual activities right away. Follow-up care is a key part of your treatment and safety. Be sure to make and go to all appointments, and call your doctor if you are having problems. It's also a good idea to keep a list of the medicines you take. Ask your doctor when you can expect to have your test results. Where can you learn more? Go to http://altaf-emerald.info/.  
Enter Z632 in the search box to learn more about \"MRI of the Head: About This Test.\" Current as of: October 9, 2017 Content Version: 11.7 © 6847-3654 [employer]. Care instructions adapted under license by [employer] (which disclaims liability or warranty for this information). If you have questions about a medical condition or this instruction, always ask your healthcare professional. [address] any warranty or liability for your use of this information. Electromyogram (EMG) and Nerve Conduction Studies: About These Tests What are they? An electromyogram (EMG) measures the electrical activity of your muscles when you are not using them (at rest) and when you tighten them (muscle contraction). Nerve conduction studies (NCS) measure how well and how fast the nerves can send electrical signals. EMG and nerve conduction studies are often done together. If they are done together, the nerve conduction studies are done before the EMG. Why are they done? You may need an EMG to find diseases that damage your muscles or nerves or to find why you cannot move your muscles (paralysis), why they feel weak, or why they twitch. You may need nerve conduction studies to find damage to the nerves that lead from the brain and spinal cord to the rest of the body (peripheral nervous system). Nerve conduction studies are often used to help find nerve disorders, such as carpal tunnel syndrome. How can you prepare for these tests? · Tell your doctors ALL the medicines, vitamins, supplements, and herbal remedies you take. Some medicines can affect the test results. You may need to stop taking some medicines before you have this test.  
  · If you take aspirin or some other blood thinner, be sure to talk to your doctor. He or she will tell you if you should stop taking it before your test. Make sure that you understand exactly what your doctor wants you to do.  
  · Wear loose-fitting clothing. You may be given a hospital gown to wear.   · The electrodes for the test are attached to your skin. Your skin needs to be clean and free of sprays, oils, creams, and lotions. What happens during the tests? You lie on a table or bed or sit in a reclining chair so your muscles are relaxed. For an EMG: 
· Your doctor will insert a needle electrode into a muscle. This will record the electrical activity while the muscle is at rest. You may feel a quick, sharp pain when the needle electrode is put into a muscle. · Your doctor will ask you to tighten the same muscle slowly and steadily while the electrical activity is recorded. · Your doctor may move the electrode to a different area of the muscle or a different muscle. For nerve conduction studies: 
· Your doctor will attach two types of electrodes to your skin. ¨ One type of electrode is placed over a nerve and will give the nerve an electrical pulse. ¨ The other type of electrode is placed over the muscle that the nerve controls. It will record how long it takes the muscle to react to the electrical pulse. · You will be able to feel the electrical pulses. They are small shocks and are safe. What else should you know about these tests? · After an EMG, you may be sore and have a tingling feeling in your muscles for up to 2 days. You may have small bruises or swelling at the needle site. · For an EMG, you may be asked to sign a consent form. Talk to your doctor about any concerns you have about the need for the test, its risks, how it will be done, or what the results will mean. How long do they take? · An EMG may take 30 to 60 minutes. · Nerve conduction tests may take from 15 minutes to 1 hour or more. It depends on how many nerves and muscles your doctor tests. What happens after these tests? · If any of the test areas are sore: ¨ Put ice or a cold pack on the area for 10 to 20 minutes at a time. Put a thin cloth between the ice and your skin.  
¨ Take an over-the-counter pain medicine, such as acetaminophen (Tylenol), ibuprofen (Advil, Motrin), or naproxen (Aleve). Be safe with medicines. Read and follow all instructions on the label. · You will probably be able to go home right away. · You can go back to your usual activities right away. When should you call for help? Watch closely for changes in your health, and be sure to contact your doctor if: · Muscle pain from an EMG test gets worse or you have swelling, tenderness, or pus at any of the needle sites. · You have any problems that you think may be from the test. 
· You have any questions about the test or have not received your results. Follow-up care is a key part of your treatment and safety. Be sure to make and go to all appointments, and call your doctor if you are having problems. It's also a good idea to keep a list of the medicines you take. Ask your doctor when you can expect to have your test results. Where can you learn more? Go to http://altaf-emerald.info/. Enter V645 in the search box to learn more about \"Electromyogram (EMG) and Nerve Conduction Studies: About These Tests. \" Current as of: October 9, 2017 Content Version: 11.7 © 7368-0812 [employer]. Care instructions adapted under license by [employer] (which disclaims liability or warranty for this information). If you have questions about a medical condition or this instruction, always ask your healthcare professional. [place] [address] any warranty or liability for your use of this information.

## 2018-07-11 NOTE — PROGRESS NOTES
Neurology Consult Note      HISTORY PROVIDED BY: patient    Chief Complaint:   Chief Complaint   Patient presents with    Back Pain    Tingling     right leg    New Patient      Subjective:    Nydia Aj is a 77 y.o. right handed male who presents in consultation for back pain, tingling sensation and numbness of the right leg. This is a 70-year-old right-handed black with history of diabetes mellitus, hypertension, dyslipidemia, who was referred to the clinic to evaluate for the numbness tingling sensation of the right leg and back pain. Patient said that the symptoms started about 2 years ago with back pain, according to patient he tried to lift this off on that fairly back pain which was more on the right side, at that time he dragged the right leg. He said he went to physical therapy after MRI of the lumbar spine which showed some disc disease, it improved and the pain went away. Patient however says recently it come back more, initially the pain was severe starting from the lower back goes down to the right leg. His primary care physician gave him some pain medication and sent him to physical therapy, he was also on Neurontin, he said while doing this, the pain decreased but was still persistent. Activity tend to make it worse. On this patient was going down to the right foot, because this is not getting much better patient was referred to neurology for further evaluation and management. Patient describes the pain as sharp in nature, continuous, stabbing at times and burning sensation, when severe, on a scale of 1-10, patient rated the pain level to be about 7. Medications help with the pain. He denies any fall, incontinence. He noted that he experiences also some numbness and tingling sensation in both hands.   Review of Systems - General ROS: positive for  - fatigue and sleep disturbance  Psychological ROS: positive for - anxiety and sleep disturbances  Ophthalmic ROS: positive for - blurry vision and decreased vision  ENT ROS: positive for - headaches and visual changes  Allergy and Immunology ROS: negative  Hematological and Lymphatic ROS: negative  Endocrine ROS: negative  Respiratory ROS: no cough, shortness of breath, or wheezing  Cardiovascular ROS: no chest pain or dyspnea on exertion  Gastrointestinal ROS: no abdominal pain, change in bowel habits, or black or bloody stools  Genito-Urinary ROS: no dysuria, trouble voiding, or hematuria  Musculoskeletal ROS: positive for - gait disturbance, joint pain, joint stiffness, joint swelling, muscle pain and muscular weakness  Neurological ROS: positive for - dizziness, gait disturbance, numbness/tingling, visual changes and weakness  Dermatological ROS: negative    Past Medical History:   Diagnosis Date    Diabetes ([facility] [address]     High cholesterol     Hypertension     Joint pain     Muscle pain     Muscle weakness     Visual disturbance       Past Surgical History:   Procedure Laterality Date    COLONOSCOPY N/A 6/20/2018    COLON[facility] performed [facility] Sarwat Mot[facility] ENDOSCOPY    HX RETINAL DETACHMENT REPAIR      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 Social History Main Topics    Smoking status: Former Smoker    Smokeless tobacco: Never Used      Comment: quit 1981    Alcohol use No    Drug use: Not on file    Sexual activity: Not on file     Other Topics Concern    Not on file     Social History Narrative     Family History   Problem Relation Age of Onset    Cancer Mother      uterine    Heart Disease Maternal Grandmother     Dementia Maternal Grandmother          Objective:   ROS  As per HPI.     No Known Allergies     Meds:  Outpatient Medications Prior to Visit   Medication Sig Dispense Refill    metFORMIN ER (GLUCOPHAGE XR) 500 mg tablet Take 1,000 mg by mouth two (2) times a day.  lisinopril (PRINIVIL, ZESTRIL) 20 mg tablet Take 20 mg by mouth daily.  triamterene-hydroCHLOROthiazide (MAXZIDE) 37.5-25 mg per tablet Take 1 Tab by mouth daily.  ASPIRIN PO Take 81 mg by mouth daily.  amLODIPine (NORVASC) 5 mg tablet Take 5 mg by mouth daily.  POTASSIUM CHLORIDE PO Take 20 mEq by mouth daily. ER micro      esomeprazole magnesium (NEXIUM PO) Take 22.5 mg by mouth daily.  Cholecalciferol, Vitamin D3, (VITAMIN D3) 2,000 unit cap capsule Take 2,000 Units by mouth daily.  cyanocobalamin (VITAMIN B-12) 1,000 mcg tablet Take 1,000 mcg by mouth daily.  labetalol (NORMODYNE) 300 mg tablet Take 900 mg by mouth two (2) times a day.  glipiZIDE (GLUCOTROL) 5 mg tablet Take 5 mg by mouth two (2) times a day.  tamsulosin (FLOMAX) 0.4 mg capsule Take 0.4 mg by mouth daily.  lovastatin (MEVACOR) 20 mg tablet Take 20 mg by mouth nightly. No facility-administered medications prior to visit. Imaging:  MRI Results (most recent):  No results found for this or any previous visit. CT Results (most recent):    Results from Hospital Encounter encounter on 09/05/16   CTA CHEST W WO CONT   Narrative INDICATION:  chest pain r/o dissections and     EXAM:  CTA Chest with contrast     COMPARISON:  None    TECHNIQUE:  Following the uneventful intravenous administration of Iodinated  contrast, thin helical axial images were obtained through the chest. 3D image  postprocessing was performed. Coronal and sagittal reformatted images were  obtained. CT dose reduction was achieved through use of a standardized protocol  tailored for this examination and automatic exposure control for dose  modulation. FINDINGS:    Thoracic aorta is normal in caliber without evidence for dissection or  significant stenosis. Great vessel origins are patent. No evidence for acute pulmonary embolism. Mild to moderate emphysema.  Bibasilar atelectasis. No other significant lung  abnormality. The central airways are patent. Tiny pericardial effusion. No pleural effusion. No thoracic lymphadenopathy. Nonspecific thickening of the adrenal glands. A couple low-density renal lesions are too small to characterize. A tiny  hyperenhancing focus in the liver are nonspecific (series 3, images 98). No acute osseous abnormality. Degenerative changes in the lower cervical spine  with grade 1/2 anterolisthesis of C7 on T1. Impression IMPRESSION:    No evidence for thoracic aortic aneurysm or dissection. No evidence for acute  pulmonary embolism. Mild to moderate emphysema. Tiny pericardial effusion. Nonspecific thickening of the adrenal glands. Tiny hyperenhancing focus in the liver is nonspecific. Degenerative changes in the lower cervical spine with grade 1/2 anterolisthesis  of C7 on T1. Reviewed records in [facility] today    Lab Review   Results for orders placed or performed during the hospital encounter of 09/05/16   CBC WITH AUTOMATED DIFF   Result Value Ref Range    WBC 11.0 4.1 - 11.1 K/uL    RBC 4.03 (L) 4.10 - 5.70 M/uL    HGB 11.1 (L) 12.1 - 17.0 g/dL    HCT 31.4 (L) 36.6 - 50.3 %    MCV 77.9 (L) 80.0 - 99.0 FL    MCH 27.5 26.0 - 34.0 PG    MCHC 35.4 30.0 - 36.5 g/dL    RDW 16.4 (H) 11.5 - 14.5 %    PLATELET 825 918 - 598 K/uL    NEUTROPHILS 73 32 - 75 %    LYMPHOCYTES 20 12 - 49 %    MONOCYTES 6 5 - 13 %    EOSINOPHILS 1 0 - 7 %    BASOPHILS 0 0 - 1 %    ABS. NEUTROPHILS 8.0 1.8 - 8.0 K/UL    ABS. LYMPHOCYTES 2.2 0.8 - 3.5 K/UL    ABS. MONOCYTES 0.7 0.0 - 1.0 K/UL    ABS. EOSINOPHILS 0.2 0.0 - 0.4 K/UL    ABS.  BASOPHILS 0.0 0.0 - 0.1 K/UL   METABOLIC PANEL, COMPREHENSIVE   Result Value Ref Range    Sodium 137 136 - 145 mmol/L    Potassium 3.3 (L) 3.5 - 5.1 mmol/L    Chloride 101 97 - 108 mmol/L    CO2 30 21 - 32 mmol/L    Anion gap 6 5 - 15 mmol/L    Glucose 158 (H) 65 - 100 mg/dL    BUN 17 6 - 20 MG/DL    Creatinine 1.06 0.70 - 1.30 MG/DL    BUN/Creatinine ratio 16 12 - 20      GFR est AA >60 >60 ml/min/1.73m2    GFR est non-AA >60 >60 ml/min/1.73m2    Calcium 9.1 8.5 - 10.1 MG/DL    Bilirubin, total 0.5 0.2 - 1.0 MG/DL    ALT (SGPT) 13 12 - 78 U/L    AST (SGOT) 10 (L) 15 - 37 U/L    Alk. phosphatase 49 45 - 117 U/L    Protein, total 7.2 6.4 - 8.2 g/dL    Albumin 3.6 3.5 - 5.0 g/dL    Globulin 3.6 2.0 - 4.0 g/dL    A-G Ratio 1.0 (L) 1.1 - 2.2     TROPONIN I   Result Value Ref Range    Troponin-I, Qt. <0.04 <0.05 ng/mL   POC TROPONIN-I   Result Value Ref Range    Troponin-I (POC) <0.04 0.00 - 0.08 ng/mL   EKG, 12 LEAD, INITIAL   Result Value Ref Range    Ventricular Rate 71 BPM    Atrial Rate 71 BPM    P-R Interval 164 ms    QRS Duration 84 ms    Q-T Interval 400 ms    QTC Calculation (Bezet) 434 ms    Calculated P Axis 76 degrees    Calculated R Axis -8 degrees    Calculated T Axis 59 degrees    Diagnosis       Normal sinus rhythm  When compared with ECG of 05-NOV-2015 13:32,  No significant change was found  Confirmed by Maria Esther Hu MD, John Ontiveros ([id number]) on 9/5/2016 11:23:29 AM          Exam:  Visit Vitals    /87 (BP 1 Location: Right arm, BP Patient Position: Sitting)    Pulse 69    Temp 98.5 °F (36.9 °C) (Temporal)    Resp 18    Ht 5' 11\" (1.803 m)    Wt 191 lb 9.6 oz (86.9 kg)    SpO2 97%    BMI 26.72 kg/m2     General:  Alert, cooperative, no distress. Head:  Normocephalic, without obvious abnormality, atraumatic. Respiratory:  Heart:   Non labored breathing  Regular rate and rhythm, no murmurs   Neck:   2+ carotids, no bruits   Extremities: Warm, no cyanosis or edema. Pulses: 2+ radial pulses. Neurologic:  MS: Alert and oriented x 4, speech intact. Language intact, able to name, repeat, and follow all commands. Attention and fund of knowledge appropriate. Recent and remote memory intact.   Cranial Nerves:  II: visual fields Full to confrontation   II: pupils Equal, round, reactive to light   II: optic disc No papilledema   III,VII: ptosis none   III,IV,VI: extraocular muscles  EOMI, no nystagmus or diplopia   V: facial light touch sensation  normal   VII: facial muscle function   symmetric   VIII: hearing intact   IX: soft palate elevation  normal   XI: trapezius strength  5/5   XI: sternocleidomastoid strength 5/5   XII: tongue  Midline     Motor: normal bulk and tone, no tremor              Strength: 4/5 right lower extremity, 5/5 rest of the extremities, no PD. Tinel sign and Phalen's sign were positive on both upper extremity. Sensory: Decreased sensation to to LT, PP, temperature, vibration, bilateral lower extremity up to the knee. Coordination: FTN and HTS intact, JUAN intact,Romberg negative  Gait: normal gait, able to heel, toe, and tandem walk  Reflexes: 2+ symmetric, toes downgoing           Assessment/Plan       ICD-10-CM ICD-9-CM    1. Neuropathy G62.9 355.9 EMG NCV MOTOR WITH F/WAVE PER NERVE      RHEUMATOID FACTOR, QL      VITAMIN D, 1, 25 DIHYDROXY      VITAMIN B12      TSH 3RD GENERATION      MRI LUMB SPINE WO CONT      ALDOLASE      CEA      LAUREN, DIRECT, W/REFLEX   2. Sensory neuropathy G62.9 356.9 EMG NCV MOTOR WITH F/WAVE PER NERVE      VITAMIN B12      TSH 3RD GENERATION      MRI LUMB SPINE WO CONT      CEA      LAUREN, DIRECT, W/REFLEX   3. Acute right-sided low back pain without sciatica M54.5 724.2 EMG NCV MOTOR WITH F/WAVE PER NERVE      CK      MRI LUMB SPINE WO CONT      ALDOLASE      traMADol (ULTRAM) 50 mg tablet   4. Paresthesia R20.2 782.0 EMG NCV MOTOR WITH F/WAVE PER NERVE      RHEUMATOID FACTOR, QL      RPR      VITAMIN D, 1, 25 DIHYDROXY      VITAMIN B12      CEA   5. Bilateral carpal tunnel syndrome G56.03 354.0    6.  Lumbar radiculopathy M54.16 724.4 RHEUMATOID FACTOR, QL      MRI LUMB SPINE WO CONT      traMADol (ULTRAM) 50 mg tablet   Plan:  Patient is already on them Neurontin I will continue patient on diet for now   Percocet 5/325 #30 one as needed for severe pain  EMG nerve conduction studies all extremities  MRI of the lumbar spine without gadolinium  Blood for autoimmune workup, vitamin B12, D, RPR, CEA, CK, aldolase. Follow-up Disposition:  Return in about 6 weeks (around 8/22/2018).       Signed:  Sabina Cervantes MD  7/11/2018

## 2018-07-11 NOTE — PROGRESS NOTES
[address] OUTPATIENT physical Therapy DAILY Treatment NOTe Visit: 3 NAME: Nava Fink AGE: 77 y.o. GENDER: male DATE: 7/11/2018 REFERRING PHYSICIAN: Trev Brandt MD 
 
 
 
GOALS Short term goals Time frame: 3 weeks 1. Patient will be compliance and independent with the initial HEP as evidenced by being able to perform without cuing. 2. Patient will report a 50% improvement in symptoms. 3. Patient report a 50% improvement in sleeping. 4. Patient will tolerate 15 minutes of clinic activities before onset of symptoms. Long term goals Time frame: 6 weeks 1. Patient will reports pain level decreased to 2/10 to allow increased ease of movement. 2. Patient will have an improved score on the [facility] Felipe outcome measure by 10 points to demonstrate an increase in functional activity tolerance. 3. Patient will be independent in final individualized HEP. 4. Patient will return to walking without being limited by symptoms. 5. Patient will sleep 6-8 hours without being interrupted by pain. SUBJECTIVE:  
I feel a little better Pain In: 5/10 OBJECTIVE DATA SUMMARY:  
 
Pain: 
Location: 
Quality: aching Now: 6/10 Best: Worst: 
Factors that improve pain: rest sitting Posture: WNL Strength:  
Right knee extension 3+/5, left knee ext 5/5 Range of Motion:  
Lumbar Ext 25 % of normal into left quadrant Right side bending  25% of naormalpainful Spinal Assessment:  
Flattened lumbar spine Joint Mobility: Hypomobile lumbar spinal segments with P-A pressure Palpation:  
TTP right quadratus, lumbar paraspinals, gluteals, piriformis, gastroc Neurologic Assessment: 
 Tone: WNL Sensation: WNL Reflexes: not tested, test next visit Special Tests:  
- SLR 
+ slump test on right Mobility: 
 Transitional Movements: Antalgic Gait: WNL Balance: WNL Functional Measure: In compliance with CMSs Claims Based Outcome Reporting, the following G-code set was chosen for this patient based on their primary functional limitation being treated: The outcome measure chosen to determine the severity of the functional limitation was the TXU Felipe with a score of 11/24 which was correlated with the impairment scale. ? Mobility - Walking and Moving Around:  
  - CURRENT STATUS: CK - 40%-59% impaired, limited or restricted  - GOAL STATUS: CI - 1%-19% impaired, limited or restricted  - D/C STATUS:  ---------------To be determined--------------- Physical Therapy Evaluation Charge Determination History Examination Presentation Decision-Making LOW Complexity : Zero comorbidities / personal factors that will impact the outcome / POC LOW Complexity : 1-2 Standardized tests and measures addressing body structure, function, activity limitation and / or participation in recreation  LOW Complexity : Stable, uncomplicated  LOW Complexity : FOTO score of  Based on the above components, the patient evaluation is determined to be of the following complexity level: LOW  
 
TREATMENT/INTERVENTION: 
Modalities (Rationale): MHP post treatment to decrease pain and muscle guarding Therapeutic Exercises: HEP   LTR, KTC, Piriformis stretch supine, PPT,Bridges, HS stretch,  gastroc stretch Clinic Activities Piriformis stretch x 10 reps LTR x 10 reps PPT 5 reps with 15 sec hold BKTC 12 reps Bridges x 12 reps Single leg lumbar rotation x 3 reps 20 sec hold B/L SLR x 12 reps Flexion/ext over back of chair x 7 reps Seated side bends x 7 reps Seated rotation x 7 reps B/L Standing B/L Hip extension x 10 reps Standing trunk extension with 10 sec hold HS stretches 3 reps with 15 sec hold Gatroc stretches 3 reps with 20 sec hold UBE/LBE x 5 min resistance of 2 Manual Therapy: 
IASTM right lumbar paraspinals L3-L5, quadratus lumborum Pt expressed understanding of the procedure and visualized response. Stanley Spies STM to right piriformis and gluteals with weighted green ball Neuro Re-Education: 
Discussed sitting with lumbar support to improve postural alignment Balance Training: 
none Ambulation/Gait Training: 
none Activity tolerance and post treatment pain report: Tolerated well Pain Out:  
 
Education: 
Education was provided to the patient on the following topics: Importance of exercises. [x]    No changes were made to the home exercise program. 
[]    The following changes were made to the home exercise program:  
Patient verbalized understanding of the topics presented. ASSESSMENT:  
Pt reports some decrease in right low back, hip and calf pain. Pt requires vc's for correct performance of exercises, motivated and compliant with HEP. Added above exercises as above with good tolerance. IASTM trial to right lumbar paraspinals, assess response next visit. Some TTP with deep pressure to right piriformis. Will attempt lumbar gapping and mob. Patients progression toward goals is as follows: 
[x]     Improving appropriately and progressing toward goals 
[]     Improving slowly and progressing toward goals 
[]     Not making progress toward goals and plan of care will be adjusted PLAN OF CARE:  
Patient continues to benefit from skilled intervention to address the above impairments. [x]    Continue treatment per established plan of care. []     Recommend the following changes to the plan of care:  
 
Recommendations/Intent for next treatment: LBE, lumbar gapping and mob, continued stretching and strengthening of lumbar and gluteal muscles. Juaquin Castellanos PT Time Calculation: 45 mins Patient Time in clinic: 
 Start Time: 0350 Stop Time: [id number]

## 2018-07-11 NOTE — MR AVS SNAPSHOT
[address] [place] [address] 
[phone] Patient: Brock Hoover MRN: [id number] TNJ:[id number] Visit Information Date & Time Provider Department Dept. Phone Encounter #  
 7/11/2018  8:00 AM Cortes Amador MD [facility] at [facility] [phone] [id number] Follow-up Instructions Return in about 6 weeks (around 8/22/2018). Upcoming Health Maintenance Date Due Hepatitis C Screening 1950 DTaP/Tdap/Td series (1 - Tdap) 3/24/1969 FOBT Q 1 YEAR AGE 50-75 3/24/1998 ZOSTER VACCINE AGE 60> 1/24/2008 GLAUCOMA SCREENING Q2Y 3/24/2013 Pneumococcal 65+ Low/Medium Risk (1 of 2 - PCV13) 3/24/2013 MEDICARE YEARLY EXAM 3/20/2018 Influenza Age 5 to Adult 8/1/2018 Allergies as of 7/11/2018  Review Complete On: 7/11/2018 By: Maylin Chowdary MD  
 No Known Allergies Current Immunizations  Never Reviewed No immunizations on file. Not reviewed this visit You Were Diagnosed With   
  
 Codes Comments Neuropathy    -  Primary ICD-10-CM: G62.9 ICD-9-CM: 355.9 Sensory neuropathy     ICD-10-CM: G62.9 ICD-9-CM: 356.9 Acute right-sided low back pain without sciatica     ICD-10-CM: M54.5 ICD-9-CM: 724.2 Paresthesia     ICD-10-CM: R20.2 ICD-9-CM: 782.0 Bilateral carpal tunnel syndrome     ICD-10-CM: G56.03 
ICD-9-CM: 354.0 Lumbar radiculopathy     ICD-10-CM: M54.16 
ICD-9-CM: 724.4 Vitals BP Pulse Temp Resp Height(growth percentile) 154/87 (BP 1 Location: Right arm, BP Patient Position: Sitting) 69 98.5 °F (36.9 °C) (Temporal) 18 5' 11\" (1.803 m) Weight(growth percentile) SpO2 BMI Smoking Status 191 lb 9.6 oz (86.9 kg) 97% 26.72 kg/m2 Former Smoker Vitals History BMI and BSA Data Body Mass Index Body Surface Area  
 26.72 kg/m 2 2.09 m 2 Preferred Pharmacy Pharmacy Name Phone [address] Natasha Blanco [phone] Your Updated Medication List  
  
   
This list is accurate as of 7/11/18  9:03 AM.  Always use your most recent med list. amLODIPine 5 mg tablet Commonly known as:  Joselyn Spruce Take 5 mg by mouth daily. ASPIRIN PO Take 81 mg by mouth daily. Cholecalciferol (Vitamin D3) 2,000 unit Cap capsule Commonly known as:  VITAMIN D3 Take 2,000 Units by mouth daily. FLOMAX 0.4 mg capsule Generic drug:  tamsulosin Take 0.4 mg by mouth daily. glipiZIDE 5 mg tablet Commonly known as:  Dago Hanly Take 5 mg by mouth two (2) times a day. glucophage  mg tablet Generic drug:  metFORMIN ER Take 1,000 mg by mouth two (2) times a day. labetalol 300 mg tablet Commonly known as:  Kayli Donn Take 900 mg by mouth two (2) times a day. lisinopril 20 mg tablet Commonly known as:  Marge Summer Take 20 mg by mouth daily. lovastatin 20 mg tablet Commonly known as:  MEVACOR Take 20 mg by mouth nightly. NEXIUM PO Take 22.5 mg by mouth daily. POTASSIUM CHLORIDE PO Take 20 mEq by mouth daily. ER micro  
  
 traMADol 50 mg tablet Commonly known as:  ULTRAM  
Take 1 Tab by mouth every six (6) hours as needed for Pain. Max Daily Amount: 200 mg.  
  
 triamterene-hydroCHLOROthiazide 37.5-25 mg per tablet Commonly known as:  Sotero Beady Take 1 Tab by mouth daily. VITAMIN B-12 1,000 mcg tablet Generic drug:  cyanocobalamin Take 1,000 mcg by mouth daily. Prescriptions Printed Refills  
 traMADol (ULTRAM) 50 mg tablet 1 Sig: Take 1 Tab by mouth every six (6) hours as needed for Pain. Max Daily Amount: 200 mg. Class: Print Route: Oral  
  
We Performed the Following ALDOLASE [id number] CPT(R)] LAUREN, DIRECT, W/REFLEX [id number] CPT(R)] CEA A090187 CPT(R)] CK K1615721 CPT(R)] RHEUMATOID FACTOR, QL S8411353 CPT(R)] RPR [58683 CPT(R)] TSH 3RD GENERATION [20210 CPT(R)] VITAMIN B12 [id number] CPT(R)] VITAMIN D, 1, 25 DIHYDROXY [47133 CPT(R)] Follow-up Instructions Return in about 6 weeks (around 8/22/2018). To-Do List   
 07/11/2018 Neurology:  EMG NCV MOTOR WITH F/WAVE PER NERVE   
  
 07/11/2018 Imaging:  MRI LUMB SPINE WO CONT   
  
 07/11/2018 9:30 AM  
  Appointment with Bernardino Chapa PT at [address] [place] [address] ([phone]) Please remember to arrive at the hospital at least 30 minutes prior to your scheduled appointment time. When you come in for your appointment, please be sure to bring the therapy prescription the doctor gave you, your insurance card, and a list of the medicines you are taking. Also, please remember to wear comfortable, loose- fitting clothes. We look forward to seeing you. Patient Instructions All test results will be discussed at the next appointment. MRI of the Head: About This Test 
What is it? MRI (magnetic resonance imaging) is a test that uses a magnetic field and pulses of radio wave energy to make pictures of the organs and structures inside the body. An MRI of the head can give your doctor information about your brain, eyes, ears, and nerves. When you have an MRI, you lie on a table and the table moves into the MRI machine. Why is this test done? An MRI of the head can help find problems such as tumors and infection. It also can check symptoms of a head injury or of diseases such as multiple sclerosis (MS) and stroke. How can you prepare for the test? 
Talk to your doctor about all your health conditions before the test. For example, tell your doctor if: 
· You are allergic to any medicines. · You are or might be pregnant.  
· You have a pacemaker, an artificial limb, any metal pins or metal parts in your body, metal heart valves, metal clips in your brain, metal implants in your ears, or any other implanted or prosthetic medical device. · You have an intrauterine device (IUD) in place. · You get nervous in confined spaces. You may need medicine to help you relax. · You wear a patch that contains medicine. · You have kidney disease. What happens before the test? 
· You will remove all metal objects, such as hearing aids, dentures, jewelry, watches, and hairpins. · You may need to take off some of your clothes. You will be given a gown to wear during the test. If you do leave some clothes on, make sure you take everything out of your pockets. · You may have contrast material (dye) put into your arm through a tube called an IV. Contrast material helps doctors see specific organs, blood vessels, and most tumors. What happens during the test? 
· You will lie on your back on a table that is part of the MRI scanner. Your head, chest, and arms may be held with straps to help you lie still. · The table will slide into the space that contains the magnet. A device called a coil may be placed over or wrapped around your head. · Inside the scanner you will hear a fan and feel air moving. You may hear tapping, thumping, or snapping noises. You may be given earplugs or headphones to reduce the noise. · You will be asked to hold still during the scan. You may be asked to hold your breath for short periods. · You may be alone in the scanning room, but a technologist will be watching you through a window and talking with you during the test. 
What else should you know about the test? 
· An MRI does not hurt. · If a dye is used, you may feel a quick sting or pinch and some coolness when the IV is started. The dye may give you a metallic taste in your mouth. Some people feel sick to their stomach or get a headache. · If you breastfeed and are concerned about whether the dye used in this test is safe, talk to your doctor. Most experts believe that very little dye passes into breast milk and even less is passed on to the baby.  But if you prefer, you can store some of your breast milk ahead of time and use it for a day or two after the test. 
· You may feel warmth in the area being examined. This is normal. 
How long does the test take? · The test usually takes 30 to 60 minutes but can take as long as 2 hours. What happens after the test? 
· You will probably be able to go home right away, depending on the reason for the test. 
· You can go back to your usual activities right away. Follow-up care is a key part of your treatment and safety. Be sure to make and go to all appointments, and call your doctor if you are having problems. It's also a good idea to keep a list of the medicines you take. Ask your doctor when you can expect to have your test results. Where can you learn more? Go to http://altaf-emerald.info/. Enter T128 in the search box to learn more about \"MRI of the Head: About This Test.\" Current as of: October 9, 2017 Content Version: 11.7 © 2741-8729 [employer]. Care instructions adapted under license by [employer] (which disclaims liability or warranty for this information). If you have questions about a medical condition or this instruction, always ask your healthcare professional. [address] any warranty or liability for your use of this information. Electromyogram (EMG) and Nerve Conduction Studies: About These Tests What are they? An electromyogram (EMG) measures the electrical activity of your muscles when you are not using them (at rest) and when you tighten them (muscle contraction). Nerve conduction studies (NCS) measure how well and how fast the nerves can send electrical signals. EMG and nerve conduction studies are often done together. If they are done together, the nerve conduction studies are done before the EMG. Why are they done?  
You may need an EMG to find diseases that damage your muscles or nerves or to find why you cannot move your muscles (paralysis), why they feel weak, or why they twitch. You may need nerve conduction studies to find damage to the nerves that lead from the brain and spinal cord to the rest of the body (peripheral nervous system). Nerve conduction studies are often used to help find nerve disorders, such as carpal tunnel syndrome. How can you prepare for these tests? · Tell your doctors ALL the medicines, vitamins, supplements, and herbal remedies you take. Some medicines can affect the test results. You may need to stop taking some medicines before you have this test.  
  · If you take aspirin or some other blood thinner, be sure to talk to your doctor. He or she will tell you if you should stop taking it before your test. Make sure that you understand exactly what your doctor wants you to do.  
  · Wear loose-fitting clothing. You may be given a hospital gown to wear.  
  · The electrodes for the test are attached to your skin. Your skin needs to be clean and free of sprays, oils, creams, and lotions. What happens during the tests? You lie on a table or bed or sit in a reclining chair so your muscles are relaxed. For an EMG: 
· Your doctor will insert a needle electrode into a muscle. This will record the electrical activity while the muscle is at rest. You may feel a quick, sharp pain when the needle electrode is put into a muscle. · Your doctor will ask you to tighten the same muscle slowly and steadily while the electrical activity is recorded. · Your doctor may move the electrode to a different area of the muscle or a different muscle. For nerve conduction studies: 
· Your doctor will attach two types of electrodes to your skin. ¨ One type of electrode is placed over a nerve and will give the nerve an electrical pulse. ¨ The other type of electrode is placed over the muscle that the nerve controls.  It will record how long it takes the muscle to react to the electrical pulse. · You will be able to feel the electrical pulses. They are small shocks and are safe. What else should you know about these tests? · After an EMG, you may be sore and have a tingling feeling in your muscles for up to 2 days. You may have small bruises or swelling at the needle site. · For an EMG, you may be asked to sign a consent form. Talk to your doctor about any concerns you have about the need for the test, its risks, how it will be done, or what the results will mean. How long do they take? · An EMG may take 30 to 60 minutes. · Nerve conduction tests may take from 15 minutes to 1 hour or more. It depends on how many nerves and muscles your doctor tests. What happens after these tests? · If any of the test areas are sore: ¨ Put ice or a cold pack on the area for 10 to 20 minutes at a time. Put a thin cloth between the ice and your skin. ¨ Take an over-the-counter pain medicine, such as acetaminophen (Tylenol), ibuprofen (Advil, Motrin), or naproxen (Aleve). Be safe with medicines. Read and follow all instructions on the label. · You will probably be able to go home right away. · You can go back to your usual activities right away. When should you call for help? Watch closely for changes in your health, and be sure to contact your doctor if: · Muscle pain from an EMG test gets worse or you have swelling, tenderness, or pus at any of the needle sites. · You have any problems that you think may be from the test. 
· You have any questions about the test or have not received your results. Follow-up care is a key part of your treatment and safety. Be sure to make and go to all appointments, and call your doctor if you are having problems. It's also a good idea to keep a list of the medicines you take. Ask your doctor when you can expect to have your test results. Where can you learn more? Go to http://altaf-emerald.info/. Enter R253 in the search box to learn more about \"Electromyogram (EMG) and Nerve Conduction Studies: About These Tests. \" Current as of: October 9, 2017 Content Version: 11.7 © 3194-7238 [employer]. Care instructions adapted under license by [employer] (which disclaims liability or warranty for this information). If you have questions about a medical condition or this instruction, always ask your healthcare professional. [place] [address] any warranty or liability for your use of this information. Introducing [facility]! [facility] introduces [employer] patient portal. Now you can access parts of your medical record, email your doctor's office, and request medication refills online. 1. In your internet browser, go to [email or web address] 2. Click on the First Time User? Click Here link in the Sign In box. You will see the New Member Sign Up page. 3. Enter your [employer] Access Code exactly as it appears below. You will not need to use this code after youve completed the sign-up process. If you do not sign up before the expiration date, you must request a new code. · [employer] Access Code: [email or web address] Expires: 9/18/2018  9:11 AM 
 
4. Enter the last four digits of your Social Security Number (xxxx) and Date of Birth (mm/dd/yyyy) as indicated and click Submit. You will be taken to the next sign-up page. 5. Create a [employer] ID. This will be your [employer] login ID and cannot be changed, so think of one that is secure and easy to remember. 6. Create a [employer] password. You can change your password at any time. 7. Enter your Password Reset Question and Answer. This can be used at a later time if you forget your password. 8. Enter your e-mail address. You will receive e-mail notification when new information is available in [address] 9. Click Sign Up. You can now view and download portions of your medical record. 10. Click the Download Summary menu link to download a portable copy of your medical information. If you have questions, please visit the Frequently Asked Questions section of the [employer] website. Remember, [employer] is NOT to be used for urgent needs. For medical emergencies, dial 911. Now available from your iPhone and Android! Please provide this summary of care documentation to your next provider. Your primary care clinician is listed as [facility]. If you have any questions after today's visit, please call [phone].

## 2018-07-12 LAB
1,25(OH)2D3 SERPL-MCNC: 41.7 PG/ML (ref 19.9–79.3)
ALDOLASE SERPL-CCNC: 3.8 U/L (ref 3.3–10.3)
ANA SER QL: NEGATIVE
CEA SERPL-MCNC: 2.2 NG/ML (ref 0–4.7)
CK SERPL-CCNC: 70 U/L (ref 24–204)
RHEUMATOID FACT SERPL-ACNC: <10 IU/ML (ref 0–13.9)
RPR SER QL: NON REACTIVE
TSH SERPL DL<=0.005 MIU/L-ACNC: 0.86 UIU/ML (ref 0.45–4.5)
VIT B12 SERPL-MCNC: 599 PG/ML (ref 232–1245)

## 2018-07-16 ENCOUNTER — HOSPITAL ENCOUNTER (OUTPATIENT)
Dept: PHYSICAL THERAPY | Age: 68
Discharge: HOME | End: 2018-07-16
Payer: MEDICARE

## 2018-07-16 NOTE — PROGRESS NOTES
[address] OUTPATIENT physical Therapy 7/16/2018: 
Kaia Teran was not seen on this date for physical therapy for the following reasons: 
 
[x]     Patient called to cancel the visit for the following reasons: conflict 
[]     Patient missed the visit and did not call to cancel.  
 
Praveena Reynaga, PT

## 2018-07-18 ENCOUNTER — HOSPITAL ENCOUNTER (OUTPATIENT)
Dept: PHYSICAL THERAPY | Age: 68
Discharge: HOME | End: 2018-07-18
Payer: MEDICARE

## 2018-07-18 PROCEDURE — 97140 MANUAL THERAPY 1/> REGIONS: CPT

## 2018-07-18 PROCEDURE — 97110 THERAPEUTIC EXERCISES: CPT

## 2018-07-18 NOTE — PROGRESS NOTES
[address] OUTPATIENT physical Therapy DAILY Treatment NOTe Visit: 4 NAME: Daja Barakat AGE: 77 y.o. GENDER: male DATE: 7/18/2018 REFERRING PHYSICIAN: Leilani Ramires MD 
 
 
 
GOALS Short term goals Time frame: 3 weeks 1. Patient will be compliance and independent with the initial HEP as evidenced by being able to perform without cuing. 2. Patient will report a 50% improvement in symptoms. 3. Patient report a 50% improvement in sleeping. 4. Patient will tolerate 15 minutes of clinic activities before onset of symptoms. Long term goals Time frame: 6 weeks 1. Patient will reports pain level decreased to 2/10 to allow increased ease of movement. 2. Patient will have an improved score on the TXU Felipe outcome measure by 10 points to demonstrate an increase in functional activity tolerance. 3. Patient will be independent in final individualized HEP. 4. Patient will return to walking without being limited by symptoms. 5. Patient will sleep 6-8 hours without being interrupted by pain. SUBJECTIVE:  
I feel a little better Pain In: 4/10 Will have a MRI at [facility] July 21 OBJECTIVE DATA SUMMARY:  
 
Pain: 
Location: 
Quality: aching Now: 6/10 Best: Worst: 
Factors that improve pain: rest sitting Posture: WNL Strength:  
Right knee extension 3+/5, left knee ext 5/5 Range of Motion:  
Lumbar Ext 25 % of normal into left quadrant Right side bending  25% of naormalpainful Spinal Assessment:  
Flattened lumbar spine Joint Mobility: Hypomobile lumbar spinal segments with P-A pressure Palpation:  
TTP right quadratus, lumbar paraspinals, gluteals, piriformis, gastroc Neurologic Assessment: 
 Tone: WNL Sensation: WNL Reflexes: not tested, test next visit Special Tests:  
- SLR 
+ slump test on right Mobility: 
 Transitional Movements: Antalgic  Gait: WNL 
 
Balance: WNL Functional Measure: In compliance with CMSs Claims Based Outcome Reporting, the following G-code set was chosen for this patient based on their primary functional limitation being treated: The outcome measure chosen to determine the severity of the functional limitation was the TXU Felipe with a score of 11/24 which was correlated with the impairment scale. ? Mobility - Walking and Moving Around:  
  - CURRENT STATUS: CK - 40%-59% impaired, limited or restricted  - GOAL STATUS: CI - 1%-19% impaired, limited or restricted  - D/C STATUS:  ---------------To be determined--------------- Physical Therapy Evaluation Charge Determination History Examination Presentation Decision-Making LOW Complexity : Zero comorbidities / personal factors that will impact the outcome / POC LOW Complexity : 1-2 Standardized tests and measures addressing body structure, function, activity limitation and / or participation in recreation  LOW Complexity : Stable, uncomplicated  LOW Complexity : FOTO score of  Based on the above components, the patient evaluation is determined to be of the following complexity level: LOW  
 
TREATMENT/INTERVENTION: 
Modalities (Rationale): MHP post treatment to decrease pain and muscle guarding Therapeutic Exercises: HEP   LTR, KTC, Piriformis stretch supine, PPT,Bridges, HS stretch,  gastroc stretch Clinic Activities Piriformis stretch x 5 reps 15 sec hold PPT 5 reps with 15 sec hold BKTC 15 reps Bridges x 10 reps 2 sets Single leg lumbar rotation x 3 reps 20 sec hold B/L SLR x 10 reps 2 sets All 4's Angry Cat Child's pose Prone Elbow prop 20 sec hold 2 reps Flexion/ext over back of chair x 7 reps Seated side bends x 7 reps Seated rotation x 2 reps 30 sec hold B/L Standing B/L Hip extension x 10 reps Standing trunk extension with 10 sec hold HS stretches 3 reps with 15 sec hold Gatroc stretches 3 reps with 20 sec hold UBE/LBE x 5 min resistance of 3 Manual Therapy: 
IASTM right lumbar paraspinals L3-L5, quadratus lumborum Pt expressed understanding of the procedure and visualized response. Ashley Mckeon STM to right piriformis and gluteals with weighted green ball Prone P-A mob of lumbar spine Grade 3 Left side lying, right lumbar gapping Neuro Re-Education: 
Discussed sitting with lumbar support to improve postural alignment Balance Training: 
none Ambulation/Gait Training: 
none Activity tolerance and post treatment pain report: Tolerated well Pain Out:  
 
Education: 
Education was provided to the patient on the following topics: Importance of exercises. [x]    No changes were made to the home exercise program. 
[]    The following changes were made to the home exercise program:  
Patient verbalized understanding of the topics presented. ASSESSMENT:  
Pt reports some decrease in right low back, hip and calf pain. Pt requires vc's for correct performance of exercises, motivated and compliant with HEP. Added prone exercises and all 4's with good tolerance. IASTM trial to right lumbar paraspinals, P-A and gapping mob of lumbar spine. Patients progression toward goals is as follows: 
[x]     Improving appropriately and progressing toward goals 
[]     Improving slowly and progressing toward goals 
[]     Not making progress toward goals and plan of care will be adjusted PLAN OF CARE:  
Patient continues to benefit from skilled intervention to address the above impairments. [x]    Continue treatment per established plan of care. []     Recommend the following changes to the plan of care:  
 
Recommendations/Intent for next treatment: LBE, lumbar gapping and mob, continued stretching and strengthening of lumbar and gluteal muscles. All 4's LE extension   ES trial?? 
 
Fred Wade PT Time Calculation: 55 mins Patient Time in clinic: 
 Start Time: 0800  Stop Time: 6998

## 2018-07-21 ENCOUNTER — HOSPITAL ENCOUNTER (OUTPATIENT)
Dept: MRI IMAGING | Age: 68
Discharge: HOME | End: 2018-07-21
Attending: PSYCHIATRY & NEUROLOGY
Payer: MEDICARE

## 2018-07-21 DIAGNOSIS — G62.9 SENSORY NEUROPATHY: ICD-10-CM

## 2018-07-21 DIAGNOSIS — G62.9 NEUROPATHY: ICD-10-CM

## 2018-07-21 DIAGNOSIS — M54.50 ACUTE RIGHT-SIDED LOW BACK PAIN WITHOUT SCIATICA: ICD-10-CM

## 2018-07-21 DIAGNOSIS — M54.16 LUMBAR RADICULOPATHY: ICD-10-CM

## 2018-07-21 PROCEDURE — 72148 MRI LUMBAR SPINE W/O DYE: CPT

## 2018-07-23 ENCOUNTER — HOSPITAL ENCOUNTER (OUTPATIENT)
Dept: PHYSICAL THERAPY | Age: 68
Discharge: HOME | End: 2018-07-23
Payer: MEDICARE

## 2018-07-23 DIAGNOSIS — G89.4 CHRONIC PAIN SYNDROME: Primary | ICD-10-CM

## 2018-07-23 PROCEDURE — 97014 ELECTRIC STIMULATION THERAPY: CPT

## 2018-07-23 PROCEDURE — 97140 MANUAL THERAPY 1/> REGIONS: CPT

## 2018-07-23 PROCEDURE — 97110 THERAPEUTIC EXERCISES: CPT

## 2018-07-23 RX ORDER — OXYCODONE AND ACETAMINOPHEN 5; 325 MG/1; MG/1
1 TABLET ORAL
Qty: 40 TAB | Refills: 0 | Status: SHIPPED | OUTPATIENT
Start: 2018-07-23 | End: 2018-09-05 | Stop reason: SDUPTHER

## 2018-07-23 NOTE — PROGRESS NOTES
[address] OUTPATIENT physical Therapy DAILY Treatment NOTe Visit: 5 NAME: Zain Cm AGE: 77 y.o. GENDER: male DATE: 7/23/2018 REFERRING PHYSICIAN: Lulu Khalil MD 
 
 
 
GOALS Short term goals Time frame: 3 weeks 1. Patient will be compliance and independent with the initial HEP as evidenced by being able to perform without cuing. 2. Patient will report a 50% improvement in symptoms. 3. Patient report a 50% improvement in sleeping. 4. Patient will tolerate 15 minutes of clinic activities before onset of symptoms. Long term goals Time frame: 6 weeks 1. Patient will reports pain level decreased to 2/10 to allow increased ease of movement. 2. Patient will have an improved score on the TXU Felipe outcome measure by 10 points to demonstrate an increase in functional activity tolerance. 3. Patient will be independent in final individualized HEP. 4. Patient will return to walking without being limited by symptoms. 5. Patient will sleep 6-8 hours without being interrupted by pain. SUBJECTIVE:  
I feel a little better Pain In: 5/10 Aug 21st meeting with Dr Kim Valenzuela, Had MRI of low back Saturday, will have EMG b/L LE soon OBJECTIVE DATA SUMMARY:  
 
Pain: 
Location: 
Quality: aching Now: 6/10 Best: Worst: 
Factors that improve pain: rest sitting Posture: WNL Strength:  
Right knee extension 3+/5, left knee ext 5/5 Range of Motion:  
Lumbar Ext 25 % of normal into left quadrant Right side bending  25% of naormalpainful Spinal Assessment:  
Flattened lumbar spine Joint Mobility: Hypomobile lumbar spinal segments with P-A pressure Palpation:  
TTP right quadratus, lumbar paraspinals, gluteals, piriformis, gastroc Neurologic Assessment: 
 Tone: WNL Sensation: WNL Reflexes: not tested, test next visit Special Tests:  
- SLR 
+ slump test on right Mobility: 
 Transitional Movements: Antalgic Gait: WNL Balance: WNL Functional Measure: In compliance with CMSs Claims Based Outcome Reporting, the following G-code set was chosen for this patient based on their primary functional limitation being treated: The outcome measure chosen to determine the severity of the functional limitation was the TXU Felipe with a score of 11/24 which was correlated with the impairment scale. ? Mobility - Walking and Moving Around:  
  - CURRENT STATUS: CK - 40%-59% impaired, limited or restricted  - GOAL STATUS: CI - 1%-19% impaired, limited or restricted  - D/C STATUS:  ---------------To be determined--------------- Physical Therapy Evaluation Charge Determination History Examination Presentation Decision-Making LOW Complexity : Zero comorbidities / personal factors that will impact the outcome / POC LOW Complexity : 1-2 Standardized tests and measures addressing body structure, function, activity limitation and / or participation in recreation  LOW Complexity : Stable, uncomplicated  LOW Complexity : FOTO score of  Based on the above components, the patient evaluation is determined to be of the following complexity level: LOW  
 
TREATMENT/INTERVENTION: 
Modalities (Rationale): MHP post treatment with ES to decrease pain and muscle guarding Therapeutic Exercises: HEP   LTR, KTC, Piriformis stretch supine, PPT,Bridges, HS stretch,  gastroc stretch Clinic Activities Piriformis figure 4 stretch 3 reps with 20 second hold PPT 5 reps with 15 sec hold BKTC 10 reps 2 sets Bridges x 10 reps 2 sets Single leg lumbar rotation x 4 reps 20 sec hold B/L SLR with 2# x 10 reps 2 sets All 4's Angry Cat Child's pose LE extension  X 10 reps each side Prone Elbow prop 20 sec hold 2 reps Flexion/ext over back of chair x 7 reps Seated side bends x 7 reps Seated rotation x 2 reps 30 sec hold B/L 
 
Standing B/L hip extension and abduction with red TB x 10 reps ( proximal hip weakness, left > right) Standing trunk extension with 10 sec hold HS stretches 3 reps with 15 sec hold Gatroc stretches 3 reps with 20 sec hold UBE/LBE x 5 min resistance of 4 Manual Therapy: 
IASTM right lumbar paraspinals L3-L5, quadratus lumborum Pt expressed understanding of the procedure and visualized response. Katherene Means STM to right piriformis and gluteals with weighted green ball Prone P-A mob of lumbar spine Grade 3 Left side lying, right lumbar gapping 3 reps 20 sec stretch. Neuro Re-Education: 
Discussed sitting with lumbar support to improve postural alignment Balance Training: 
none Ambulation/Gait Training: 
none Activity tolerance and post treatment pain report: Tolerated well Pain Out:  
 
Education: 
Education was provided to the patient on the following topics: Importance of exercises. [x]    No changes were made to the home exercise program. 
[]    The following changes were made to the home exercise program:  
Patient verbalized understanding of the topics presented. ASSESSMENT:  
Pt reports some decrease in right low back, hip and calf pain. Pt requires vc's for correct performance of exercises, motivated and compliant with HEP. Added prone exercises and All 4's. P-A and gapping mob of lumbar spine, STM of right gluteals and piriformis partially reproduce right LE symptoms. Noted significant proximal weakness in LLE, (opposite of painful hip) Assess response to IASTM and ES trial. 
Patients progression toward goals is as follows: 
[x]     Improving appropriately and progressing toward goals 
[]     Improving slowly and progressing toward goals 
[]     Not making progress toward goals and plan of care will be adjusted PLAN OF CARE:  
Patient continues to benefit from skilled intervention to address the above impairments.  
 
[x]    Continue treatment per established plan of care. 
[]     Recommend the following changes to the plan of care:  
 
Recommendations/Intent for next treatment: LBE, lumbar gapping and mob, continued stretching and strengthening of lumbar and gluteal muscles. Wing Barragan, PT Time Calculation: 45 mins Patient Time in clinic: 
 Start Time: 0800 Stop Time: 7506

## 2018-07-25 ENCOUNTER — HOSPITAL ENCOUNTER (OUTPATIENT)
Dept: PHYSICAL THERAPY | Age: 68
Discharge: HOME | End: 2018-07-25
Payer: MEDICARE

## 2018-07-25 PROCEDURE — 97014 ELECTRIC STIMULATION THERAPY: CPT

## 2018-07-25 PROCEDURE — 97110 THERAPEUTIC EXERCISES: CPT

## 2018-07-25 NOTE — PROGRESS NOTES
[address] OUTPATIENT physical Therapy DAILY Treatment NOTe Visit: 6 NAME: Cb Tolentino AGE: 77 y.o. GENDER: male DATE: 8/8/2018 REFERRING PHYSICIAN: Ortiz Briscoe MD 
 
 
 
GOALS Short term goals Time frame: 3 weeks 1. Patient will be compliance and independent with the initial HEP as evidenced by being able to perform without cuing. 2. Patient will report a 50% improvement in symptoms. 3. Patient report a 50% improvement in sleeping. 4. Patient will tolerate 15 minutes of clinic activities before onset of symptoms. Long term goals Time frame: 6 weeks 1. Patient will reports pain level decreased to 2/10 to allow increased ease of movement. 2. Patient will have an improved score on the TXU Felipe outcome measure by 10 points to demonstrate an increase in functional activity tolerance. 3. Patient will be independent in final individualized HEP. 4. Patient will return to walking without being limited by symptoms. 5. Patient will sleep 6-8 hours without being interrupted by pain. SUBJECTIVE:  
 
Chief complaint is \"spasm\" in right calf and back of my thigh\"  Sometimes it goes into my right hip and low back. Pain In: 5/10 Aug 21st meeting with Dr Fidel Tracey, Had MRI of low back Saturday, will have EMG B/L LE soon OBJECTIVE DATA SUMMARY:  
 
Pain: 
Location: 
Quality: aching Now: 6/10 Best: Worst: 
Factors that improve pain: rest sitting Posture: WNL Strength:  
Right knee extension 3+/5, left knee ext 5/5 Range of Motion:  
Lumbar Ext 25 % of normal into left quadrant Right side bending  25% of naormalpainful Spinal Assessment:  
Flattened lumbar spine Joint Mobility: Hypomobile lumbar spinal segments with P-A pressure Palpation:  
TTP right quadratus, lumbar paraspinals, gluteals, piriformis, gastroc Neurologic Assessment: 
 Tone: WNL Sensation: WNL  Reflexes: not tested, test next visit Special Tests:  
- SLR 
+ slump test on right Mobility: 
 Transitional Movements: Antalgic Gait: WNL Balance: WNL Functional Measure: In compliance with CMSs Claims Based Outcome Reporting, the following G-code set was chosen for this patient based on their primary functional limitation being treated: The outcome measure chosen to determine the severity of the functional limitation was the TXU Felipe with a score of 11/24 which was correlated with the impairment scale. ? Mobility - Walking and Moving Around:  
  - CURRENT STATUS: CK - 40%-59% impaired, limited or restricted  - GOAL STATUS: CI - 1%-19% impaired, limited or restricted  - D/C STATUS:  ---------------To be determined--------------- Physical Therapy Evaluation Charge Determination History Examination Presentation Decision-Making LOW Complexity : Zero comorbidities / personal factors that will impact the outcome / POC LOW Complexity : 1-2 Standardized tests and measures addressing body structure, function, activity limitation and / or participation in recreation  LOW Complexity : Stable, uncomplicated  LOW Complexity : FOTO score of  Based on the above components, the patient evaluation is determined to be of the following complexity level: LOW  
 
TREATMENT/INTERVENTION: 
Modalities (Rationale): MHP post treatment with ES to decrease pain and muscle guarding. ES electrodes placed on right gluteal muscles and right calf muscles. Therapeutic Exercises: HEP   LTR, KTC, Piriformis stretch supine, PPT,Bridges, HS stretch,  gastroc stretch Clinic Activities Piriformis figure 4 stretch 3 reps with 20 second hold PPT 5 reps with 15 sec hold BKTC 10 reps 2 sets Bridges with 10# x 10 reps 2 sets Single leg lumbar rotation x 4 reps 20 sec hold B/L SLR with 3# x 10 reps 2 sets All 4's Angry Cat Child's pose LE extension  X 10 reps each side 
 
Prone Elbow prop 20 sec hold 2 reps B/L hip extension 10 reps x 2 sets Flexion/ext over back of chair x 7 reps Seated side bends x 7 reps Seated rotation x 2 reps 30 sec hold B/L Standing B/L hip extension and abduction with 2# x 10 reps ( proximal hip weakness, left > right) Band walking with red TB 4 x 25 Standing trunk extension with 10 sec hold B/L HS stretches 3 reps with 20 sec hold Gatroc stretches 3 reps with 20 sec hold UBE/LBE x 6 min resistance of 5 Manual Therapy: 
IASTM right lumbar paraspinals L3-L5, quadratus lumborum Pt expressed understanding of the procedure and visualized response. Ashley Mckeon STM to right piriformis and gluteals with weighted green ball Prone P-A mob of lumbar spine Grade 3 Left side lying, right lumbar gapping 3 reps 20 sec stretch. Neuro Re-Education: 
Discussed sitting with lumbar support to improve postural alignment Balance Training: 
none Ambulation/Gait Training: 
none Activity tolerance and post treatment pain report: Tolerated well Pain Out:  
 
Education: 
Education was provided to the patient on the following topics: Importance of exercises. [x]    No changes were made to the home exercise program. 
[]    The following changes were made to the home exercise program:  
Patient verbalized understanding of the topics presented. ASSESSMENT:  
Pt reports some decrease in right low back, hip and calf pain. Chief complaint is right calf and thigh spasm. Pt requires vc's for correct performance of exercises, motivated and compliant with HEP. Focused on stretching and strengthening right gluteals, HS and Gastroc. Noted significant proximal weakness in LLE, (opposite of painful hip). Good response to ES Patients progression toward goals is as follows: 
[x]     Improving appropriately and progressing toward goals 
[]     Improving slowly and progressing toward goals 
[]     Not making progress toward goals and plan of care will be adjusted PLAN OF CARE:  
Patient continues to benefit from skilled intervention to address the above impairments. [x]    Continue treatment per established plan of care. []     Recommend the following changes to the plan of care:  
 
Recommendations/Intent for next treatment:  lumbar gapping and mob, continued stretching and strengthening of lumbar and gluteal muscles. Trung Frost, PT Time Calculation: 50 mins Patient Time in clinic: 
 Start Time: 5908 Stop Time: 7163

## 2018-07-30 ENCOUNTER — HOSPITAL ENCOUNTER (OUTPATIENT)
Dept: PHYSICAL THERAPY | Age: 68
Discharge: HOME | End: 2018-07-30
Payer: MEDICARE

## 2018-07-30 PROCEDURE — 97014 ELECTRIC STIMULATION THERAPY: CPT

## 2018-07-30 PROCEDURE — 97110 THERAPEUTIC EXERCISES: CPT

## 2018-07-30 PROCEDURE — 97140 MANUAL THERAPY 1/> REGIONS: CPT

## 2018-07-30 NOTE — PROGRESS NOTES
[address] OUTPATIENT physical Therapy DAILY Treatment NOTe Visit: 7 NAME: Obed Beverly AGE: 77 y.o. GENDER: male DATE: 8/8/2018 REFERRING PHYSICIAN: Francesco Watson MD 
 
 
 
GOALS Short term goals Time frame: 3 weeks 1. Patient will be compliance and independent with the initial HEP as evidenced by being able to perform without cuing. 2. Patient will report a 50% improvement in symptoms. 3. Patient report a 50% improvement in sleeping. 4. Patient will tolerate 15 minutes of clinic activities before onset of symptoms. Long term goals Time frame: 6 weeks 1. Patient will reports pain level decreased to 2/10 to allow increased ease of movement. 2. Patient will have an improved score on the TXU Felipe outcome measure by 10 points to demonstrate an increase in functional activity tolerance. 3. Patient will be independent in final individualized HEP. 4. Patient will return to walking without being limited by symptoms. 5. Patient will sleep 6-8 hours without being interrupted by pain. SUBJECTIVE:  
I feel a little better, Has a good day (no pain) and then pain returns the next day. Right hip and posterior thigh. Pain In: 5/10 bad day, good day 0/10 Aug 21st meeting with Dr Milly Patel, Had MRI of low back Saturday, will have EMG b/L LE soon OBJECTIVE DATA SUMMARY:  
 
Pain: 
Location: 
Quality: aching Now: 6/10 Best: Worst: 
Factors that improve pain: rest sitting Posture: WNL Strength:  
Right knee extension 3+/5, left knee ext 5/5 Range of Motion:  
Lumbar Ext 25 % of normal into left quadrant Right side bending  25% of naormalpainful Spinal Assessment:  
Flattened lumbar spine Joint Mobility: Hypomobile lumbar spinal segments with P-A pressure Palpation:  
TTP right quadratus, lumbar paraspinals, gluteals, piriformis, gastroc Neurologic Assessment: 
 Tone: WNL 
 Sensation: WNL Reflexes: not tested, test next visit Special Tests:  
- SLR 
+ slump test on right Mobility: 
 Transitional Movements: Antalgic Gait: WNL Balance: WNL Functional Measure: In compliance with CMSs Claims Based Outcome Reporting, the following G-code set was chosen for this patient based on their primary functional limitation being treated: The outcome measure chosen to determine the severity of the functional limitation was the TXU Felipe with a score of 11/24 which was correlated with the impairment scale. ? Mobility - Walking and Moving Around:  
  - CURRENT STATUS: CK - 40%-59% impaired, limited or restricted  - GOAL STATUS: CI - 1%-19% impaired, limited or restricted  - D/C STATUS:  ---------------To be determined--------------- Physical Therapy Evaluation Charge Determination History Examination Presentation Decision-Making LOW Complexity : Zero comorbidities / personal factors that will impact the outcome / POC LOW Complexity : 1-2 Standardized tests and measures addressing body structure, function, activity limitation and / or participation in recreation  LOW Complexity : Stable, uncomplicated  LOW Complexity : FOTO score of  Based on the above components, the patient evaluation is determined to be of the following complexity level: LOW  
 
TREATMENT/INTERVENTION: 
Modalities (Rationale): MHP post treatment with ES to decrease pain and muscle guarding Therapeutic Exercises: HEP   LTR, KTC, Piriformis stretch supine, PPT,Bridges, HS stretch,  gastroc stretch Clinic Activities Piriformis figure 4 stretch 3 reps with 20 second hold PPT 5 reps with 15 sec hold BKTC 10 reps 2 sets Bridges with 10#x 10 reps 2 sets Single leg lumbar rotation x 4 reps 20 sec hold B/L SLR with 2# x 10 reps 2 sets All 4's Angry Cat Child's pose LE extension  X 10 reps each side Prone Elbow prop 20 sec hold 3 reps 
 
Flexion/ext over back of chair x 7 reps Seated side bends x 7 reps Seated rotation x 2 reps 30 sec hold B/L Standing B/L hip extension and abduction with 3 #  10 reps Standing trunk extension with 10 sec hold HS stretches 3 reps with 30 sec hold Gatroc stretches 3 reps with 20 sec hold UBE/LBE x 10 min resistance of 4 Manual Therapy: 
 
IASTM right lumbar paraspinals L3-L5, quadratus lumborum Pt expressed understanding of the procedure and visualized response. Godfrey Omalley STM to right piriformis and gluteals, (piriformis reproduces symptoms) Prone P-A mob of lumbar spine Grade 3 (reproduces RLE symptoms) Left side lying, right lumbar gapping 3 reps 20 sec stretch. Neuro Re-Education: 
Discussed sitting with lumbar support to improve postural alignment Balance Training: 
none Ambulation/Gait Training: 
none Activity tolerance and post treatment pain report: Tolerated well Pain Out:  
 
Education: 
Education was provided to the patient on the following topics: Importance of exercises. [x]    No changes were made to the home exercise program. 
[]    The following changes were made to the home exercise program:  
Patient verbalized understanding of the topics presented. ASSESSMENT:  
Pt reports some decrease in right low back, hip, symptoms no longer present in his right calf. Pt requires vc's for correct performance of exercises, motivated and compliant with HEP. P-A and gapping mob of lumbar spine and (deep) STM of right piriformis partially reproduce right LE symptoms. Patients progression toward goals is as follows: 
[x]     Improving appropriately and progressing toward goals 
[]     Improving slowly and progressing toward goals 
[]     Not making progress toward goals and plan of care will be adjusted PLAN OF CARE:  
Patient continues to benefit from skilled intervention to address the above impairments.  
 
[x]    Continue treatment per established plan of care. 
[]     Recommend the following changes to the plan of care:  
 
Recommendations/Intent for next treatment: LBE, lumbar gapping and mob, continued stretching and strengthening of lumbar and gluteal muscles. Lumbar rotation mob. Latonia Reardon, PT Time Calculation: 50 mins Patient Time in clinic: 
 Start Time: 3442 Stop Time: 5719

## 2018-07-31 ENCOUNTER — OFFICE VISIT (OUTPATIENT)
Dept: NEUROLOGY | Age: 68
End: 2018-07-31

## 2018-07-31 DIAGNOSIS — R20.9 SENSORY DISORDER: ICD-10-CM

## 2018-07-31 DIAGNOSIS — G62.9 NEUROPATHY: ICD-10-CM

## 2018-07-31 DIAGNOSIS — G56.03 BILATERAL CARPAL TUNNEL SYNDROME: Primary | ICD-10-CM

## 2018-07-31 NOTE — PROGRESS NOTES
[facility]  [address], [address], [address]  p: [phone]  f: [phone]    Test Date:  2018    Patient: Vira Cerda : 1950 Physician: Nick Amaral   Sex: Female Height: 5' 11\" Ref Phys:    ID#:  Weight: 191 lbs. Technician: Jenny Lebron     Patient Complaints:  Neuropathy,radiculopathy, hand pain and weakness. Medications:See chart      Patient History / Exam:This is 77year old right handed black male who is been evaluated for hand pain weakness and tingling sensation, positive Tinel's sign. NCV & EMG Findings:  Evaluation of the left median sensory and the right median sensory nerves showed prolonged distal peak latency (L4.2, R4.7 ms) and decreased conduction velocity (Wrist-2nd Digit, L33, R30 m/s). The left ulnar sensory nerve showed prolonged distal peak latency (3.9 ms) and decreased conduction velocity (Wrist-5th Digit, 36 m/s). All remaining nerves (as indicated in the following tables) were within normal limits. Left vs. Right side comparison data for the ulnar motor nerve indicates abnormal L-R amplitude difference (32.9 %). The median sensory nerve indicates abnormal L-R latency difference (0.5 ms). The ulnar sensory nerve indicates abnormal L-R latency difference (0.5 ms). All remaining left vs. right side differences were within normal limits. All F Wave latencies were within normal limits. All F Wave left vs. right side latency differences were within normal limits. All examined muscles (as indicated in the following table) showed no evidence of electrical instability. Impression: Abnormal study. There is electromyographic evidence bilateral sensorimotor neuropathy median nerve,sensorimotor left ulnar nerve,this is consistent with bilateral Carpal tunnel syndrome. . Etiology yet to be determined.       Recommendations:Carpal tunnel release suggested.      ___________________________          Nerve Conduction Studies  Anti Sensory Summary Table     Stim Site NR Peak (ms) Norm Peak (ms) P-T Amp (µV) Norm P-T Amp Site1 Site2 Delta-P (ms) Dist (cm) Josh (m/s) Norm Josh (m/s)   Left Median Anti Sensory (2nd Digit)  31.6°C   Wrist    4.2 <3.6 15.9 >10 Wrist 2nd Digit 4.2 14.0 33 >39   Right Median Anti Sensory (2nd Digit)  32.1°C   Wrist    4.7 <3.6 14.8 >10 Wrist 2nd Digit 4.7 14.0 30 >39   Left Ulnar Anti Sensory (5th Digit)  31.5°C   Wrist    3.9 <3.7 20.6 >15.0 Wrist 5th Digit 3.9 14.0 36 >38   Right Ulnar Anti Sensory (5th Digit)  32.1°C   Wrist    3.4 <3.7 21.4 >15.0 Wrist 5th Digit 3.4 14.0 41 >38     Motor Summary Table     Stim Site NR Onset (ms) Norm Onset (ms) O-P Amp (mV) Norm O-P Amp Site1 Site2 Delta-0 (ms) Dist (cm) Josh (m/s) Norm Josh (m/s)   Left Median Motor (Abd Poll Brev)  30.4°C   Wrist    3.8 <4.2 8.6 >5 Elbow Wrist 5.0 29.0 58 >50   Elbow    8.8  8.0          Right Median Motor (Abd Poll Brev)  31.9°C   Wrist    4.1 <4.2 7.7 >5 Elbow Wrist 5.4 27.0 50 >50   Elbow    9.5  7.3          Left Ulnar Motor (Abd Dig Min)  30.9°C   Wrist    3.1 <4.2 4.9 >3 B Elbow Wrist 5.0 28.0 56 >53   B Elbow    8.1  5.3  A Elbow B Elbow 1.5 10.0 67 >53   A Elbow    9.6  4.9          Right Ulnar Motor (Abd Dig Min)  32°C   Wrist    3.0 <4.2 7.3 >3 B Elbow Wrist 4.6 26.0 57 >53   B Elbow    7.6  6.8  A Elbow B Elbow 1.5 10.0 67 >53   A Elbow    9.1  6.7            F Wave Studies     NR F-Lat (ms) Lat Norm (ms) L-R F-Lat (ms) L-R Lat Norm   Left Ulnar (Abd Dig Min)  31.1°C      34.38 <36 0.36 <2.5   Right Ulnar (Abd Dig Min)  31.9°C      34.01 <36 0.36 <2.5     EMG+     Side Muscle Nerve Root Ins Act Fibs Psw Amp Dur Poly Recrt Int Poole Sample Comment   Right Deltoid Axillary C5-6 Nml Nml Nml Nml Nml 0 Nml Nml    Right Biceps Musculocut C5-6 Nml Nml Nml Nml Nml 0 Nml Nml    Right Triceps Radial C6-7-8 Nml Nml Nml Nml Nml 0 Nml Nml    Right BrachioRad Radial C5-6 Nml Nml Nml Nml Nml 0 Nml Nml    Right Abd Poll Brev Median C8-T1 Nml Nml Nml Nml Nml 0 Nml Nml        Nerve Conduction Studies  Anti Sensory Left/Right Comparison     Stim Site L Lat (ms) R Lat (ms) L-R Lat (ms) L Amp (µV) R Amp (µV) L-R Amp (%) Site1 Site2 L Josh (m/s) R Josh (m/s) L-R Josh (m/s)   Median Anti Sensory (2nd Digit)  31.6°C   Wrist 4.2 4.7 0.5 15.9 14.8 6.9 Wrist 2nd Digit 33 30 3   Ulnar Anti Sensory (5th Digit)  31.5°C   Wrist 3.9 3.4 0.5 20.6 21.4 3.7 Wrist 5th Digit 36 41 5     Motor Left/Right Comparison     Stim Site L Lat (ms) R Lat (ms) L-R Lat (ms) L Amp (mV) R Amp (mV) L-R Amp (%) Site1 Site2 L Josh (m/s) R Josh (m/s) L-R Josh (m/s)   Median Motor (Abd Poll Brev)  30.4°C   Wrist 3.8 4.1 0.3 8.6 7.7 10.5 Elbow Wrist 58 50 8   Elbow 8.8 9.5 0.7 8.0 7.3 8.8        Ulnar Motor (Abd Dig Min)  30.9°C   Wrist 3.1 3.0 0.1 4.9 7.3 32.9 B Elbow Wrist 56 57 1   B Elbow 8.1 7.6 0.5 5.3 6.8 22.1 A Elbow B Elbow 67 67 0   A Elbow 9.6 9.1 0.5 4.9 6.7 26.9              Waveforms:

## 2018-08-08 ENCOUNTER — HOSPITAL ENCOUNTER (OUTPATIENT)
Dept: PHYSICAL THERAPY | Age: 68
Discharge: HOME | End: 2018-08-08
Payer: MEDICARE

## 2018-08-08 PROCEDURE — 97140 MANUAL THERAPY 1/> REGIONS: CPT

## 2018-08-08 PROCEDURE — 97014 ELECTRIC STIMULATION THERAPY: CPT

## 2018-08-08 PROCEDURE — 97110 THERAPEUTIC EXERCISES: CPT

## 2018-08-08 NOTE — PROGRESS NOTES
[facility]  [address], [address] [facility]    OUTPATIENT physical Therapy DAILY Treatment NOTe  Visit: 8    NAME: Kaia Teran AGE: 77 y.o. GENDER: male  DATE: 8/8/2018  REFERRING PHYSICIAN: Saintclair Shope, MD        GOALS  Short term goals  Time frame: 3 weeks  1. Patient will be compliance and independent with the initial HEP as evidenced by being able to perform without cuing. 2. Patient will report a 50% improvement in symptoms. 3. Patient report a 50% improvement in sleeping. 4. Patient will tolerate 15 minutes of clinic activities before onset of symptoms. Long term goals  Time frame: 6 weeks  1. Patient will reports pain level decreased to 2/10 to allow increased ease of movement. 2. Patient will have an improved score on the TXU Felipe outcome measure by 10 points to demonstrate an increase in functional activity tolerance. 3. Patient will be independent in final individualized HEP. 4. Patient will return to walking without being limited by symptoms. 5. Patient will sleep 6-8 hours without being interrupted by pain. SUBJECTIVE:   I feel a little better  Pain In: 5/10  9/4 meeting with Dr Natalio Wakefield, Had MRI of low back Saturday, will have EMG b/L LE prior to visist with Dr frederick    OBJECTIVE DATA SUMMARY:     Pain:  Location:  Quality: aching  Now: 6/10  Best:  Worst:  Factors that improve pain: rest sitting    Posture:    WNL    Strength:   Right knee extension 3+/5, left knee ext 5/5    Range of Motion:   Lumbar   Ext 25 % of normal into left quadrant  Right side bending  25% of naormalpainful    Spinal Assessment:   Flattened lumbar spine      Joint Mobility:   Hypomobile lumbar spinal segments with P-A pressure    Palpation:   TTP right quadratus, lumbar paraspinals, gluteals, piriformis, gastroc    Neurologic Assessment:   Tone: WNL   Sensation: WNL   Reflexes: not tested, test next visit    Special Tests:   - SLR  + slump test on right    Mobility:   Transitional Movements: Antalgic   Gait: WNL    Balance: WNL    Functional Measure: In compliance with CMSs Claims Based Outcome Reporting, the following G-code set was chosen for this patient based on their primary functional limitation being treated: The outcome measure chosen to determine the severity of the functional limitation was the TXU Felipe with a score of 11/24 which was correlated with the impairment scale.     ? Mobility - Walking and Moving Around:     - CURRENT STATUS: CK - 40%-59% impaired, limited or restricted    - GOAL STATUS: CI - 1%-19% impaired, limited or restricted    - D/C STATUS:  ---------------To be determined---------------      Physical Therapy Evaluation Charge Determination   History Examination Presentation Decision-Making   LOW Complexity : Zero comorbidities / personal factors that will impact the outcome / POC LOW Complexity : 1-2 Standardized tests and measures addressing body structure, function, activity limitation and / or participation in recreation  LOW Complexity : Stable, uncomplicated  LOW Complexity : FOTO score of       Based on the above components, the patient evaluation is determined to be of the following complexity level: LOW     TREATMENT/INTERVENTION:  Modalities (Rationale):   MHP post treatment with ES to decrease pain and muscle guarding    Therapeutic Exercises:  HEP   LTR, KTC, Piriformis stretch supine, PPT,Bridges, HS stretch,  gastroc stretch    Clinic Activities    Piriformis figure 4 stretch 3 reps with 20 second hold   PPT 5 reps with 15 sec hold  BKTC 10 reps 2 sets  Bridges with 10# x 10 reps 2 sets  Single leg lumbar rotation x 4 reps 20 sec hold  B/L SLR with 2# x 10 reps 2 sets    All 4's  Angry Cat 8 reps  Child's pose  LE extension  X 10 reps each side    Prone  Elbow prop 20 sec, painful     Flexion/ext over back of chair x 7 reps  Seated side bends x 7 reps  Seated rotation x 2 reps 30 sec hold B/L    Standing  B/L hip extension and abduction with greenTB x 10 reps ( proximal hip weakness, left > right)  Standing trunk extension with 10 sec hold  HS stretches 3 reps with 15 sec hold  Gatroc stretches 3 reps with 20 sec hold  Side steps with red TB 60'    UBE/LBE x 7 min resistance of 4    Manual Therapy:  IASTM right lumbar paraspinals L3-L5, quadratus lumborum   Pt expressed understanding of the procedure and visualized response. .  STM to right piriformis and gluteals with weighted green ball    Prone P-A mob of lumbar spine Grade 3  Left side lying, right lumbar gapping 3 reps 20 sec stretch. Left side lying lumbar passive rotation stretch    Neuro Re-Education:  Discussed sitting with lumbar support to improve postural alignment    Balance Training:  none    Ambulation/Gait Training:  none    Activity tolerance and post treatment pain report: Tolerated well  Pain Out:     Education:  Education was provided to the patient on the following topics: Importance of exercises. [x]    No changes were made to the home exercise program.  []    The following changes were made to the home exercise program:   Patient verbalized understanding of the topics presented. ASSESSMENT:   Pt reports some decrease in right low back, hip and calf pain. Pt requires vc's for correct performance of exercises, motivated and compliant with HEP. Passive lumbar stretch with pt in left side lying. IASTM right lumbar paraspinals and over right SI, good response. Noted significant proximal weakness in LLE, (opposite of painful hip) Assess response to IASTM. Patients progression toward goals is as follows:  [x]     Improving appropriately and progressing toward goals  []     Improving slowly and progressing toward goals  []     Not making progress toward goals and plan of care will be adjusted    PLAN OF CARE:   Patient continues to benefit from skilled intervention to address the above impairments.     [x]    Continue treatment per established plan of care. []     Recommend the following changes to the plan of care:     Recommendations/Intent for next treatment: prone mob./stm right gluteal muscles. Try right LE distraction, check leg length discrepancy.       Shai Morales, PT   Time Calculation: 55 mins  Patient Time in clinic:   Start Time: 1801 [employer] - Financial Reconciliation   Stop Time: 0021

## 2018-08-20 ENCOUNTER — HOSPITAL ENCOUNTER (OUTPATIENT)
Dept: PHYSICAL THERAPY | Age: 68
Discharge: HOME | End: 2018-08-20
Payer: MEDICARE

## 2018-08-20 PROCEDURE — 97110 THERAPEUTIC EXERCISES: CPT

## 2018-08-20 PROCEDURE — 97014 ELECTRIC STIMULATION THERAPY: CPT

## 2018-08-20 PROCEDURE — 97140 MANUAL THERAPY 1/> REGIONS: CPT

## 2018-08-20 NOTE — PROGRESS NOTES
[facility]  [address], [address] [facility]    OUTPATIENT physical Therapy DAILY Treatment NOTe  Visit: 9    NAME: Cb Tolentino AGE: 77 y.o. GENDER: male  DATE: 8/20/2018  REFERRING PHYSICIAN: Ortiz Briscoe MD        GOALS  Short term goals  Time frame: 3 weeks  1. Patient will be compliance and independent with the initial HEP as evidenced by being able to perform without cuing. 2. Patient will report a 50% improvement in symptoms. 3. Patient report a 50% improvement in sleeping. 4. Patient will tolerate 15 minutes of clinic activities before onset of symptoms. Long term goals  Time frame: 6 weeks  1. Patient will reports pain level decreased to 2/10 to allow increased ease of movement. 2. Patient will have an improved score on the TXU Felipe outcome measure by 10 points to demonstrate an increase in functional activity tolerance. 3. Patient will be independent in final individualized HEP. 4. Patient will return to walking without being limited by symptoms. 5. Patient will sleep 6-8 hours without being interrupted by pain. SUBJECTIVE:   I feel a little better  Pain In: 4/10  9/5 meeting with Dr Fidel Tracey, Had MRI of low back Saturday, will have EMG B/L LE 9/4    OBJECTIVE DATA SUMMARY:     Pain:  Location:  Quality: aching  Now: 6/10  Best:  Worst:  Factors that improve pain: rest sitting    Posture:    WNL    Strength:   Right knee extension 3+/5, left knee ext 5/5    Range of Motion:   Lumbar   Ext 25 % of normal into left quadrant  Right side bending  25% of naormalpainful    Spinal Assessment:   Flattened lumbar spine      Joint Mobility:   Hypomobile lumbar spinal segments with P-A pressure    Palpation:   TTP right quadratus, lumbar paraspinals, gluteals, piriformis, gastroc    Neurologic Assessment:   Tone: WNL   Sensation: WNL   Reflexes: not tested, test next visit    Special Tests:   - SLR  + slump test on right    Mobility:   Transitional Movements: Antalgic   Gait: WNL    Balance: WNL    Functional Measure: In compliance with CMSs Claims Based Outcome Reporting, the following G-code set was chosen for this patient based on their primary functional limitation being treated: The outcome measure chosen to determine the severity of the functional limitation was the TXU Felipe with a score of 11/24 which was correlated with the impairment scale.     ? Mobility - Walking and Moving Around:     - CURRENT STATUS: CK - 40%-59% impaired, limited or restricted    - GOAL STATUS: CI - 1%-19% impaired, limited or restricted    - D/C STATUS:  ---------------To be determined---------------      Physical Therapy Evaluation Charge Determination   History Examination Presentation Decision-Making   LOW Complexity : Zero comorbidities / personal factors that will impact the outcome / POC LOW Complexity : 1-2 Standardized tests and measures addressing body structure, function, activity limitation and / or participation in recreation  LOW Complexity : Stable, uncomplicated  LOW Complexity : FOTO score of       Based on the above components, the patient evaluation is determined to be of the following complexity level: LOW     TREATMENT/INTERVENTION:  Modalities (Rationale):   MHP post treatment with ES to decrease pain and muscle guarding    Therapeutic Exercises:  HEP   LTR, KTC, Piriformis stretch supine, PPT,Bridges, HS stretch,  gastroc stretch    Clinic Activities    Piriformis figure 4 stretch 3 reps with 20 second hold   PPT 5 reps with 15 sec hold  BKTC 10 reps 2 sets  Bridges with 10# x 10 reps 2 sets  Single leg lumbar rotation x 4 reps 20 sec hold  B/L SLR with 3# x 10 reps 2 sets    All 4's  Angry Cat 8 reps  Child's pose  LE extension  X 15 reps each side    Prone  Elbow prop 20 sec, painful     Flexion/ext over back of chair x 7 reps  Seated side bends x 7 reps  Seated rotation x 2 reps 30 sec hold B/L    Standing  B/L hip extension and abduction with greenTB x 10 reps ( proximal hip weakness, left > right)  Standing trunk extension with 10 sec hold  HS stretches 3 reps with 15 sec hold  Gatroc stretches 3 reps with 20 sec hold  Side steps with green TB 60'    UBE/LBE x 9 min resistance of 4    Manual Therapy:  IASTM right lumbar paraspinals L3-L5, quadratus lumborum   Pt expressed understanding of the procedure and visualized response. .  STM to right piriformis and gluteals with weighted green ball    Prone P-A mob of lumbar spine Grade 3  Left side lying, right lumbar gapping 3 reps 20 sec stretch. Left side lying lumbar passive rotation stretch    Neuro Re-Education:  Discussed sitting with lumbar support to improve postural alignment    Balance Training:  none    Ambulation/Gait Training:  none    Activity tolerance and post treatment pain report: Tolerated well  Pain Out:     Education:  Education was provided to the patient on the following topics: Importance of exercises. [x]    No changes were made to the home exercise program.  []    The following changes were made to the home exercise program:   Patient verbalized understanding of the topics presented. ASSESSMENT:   Pt reports some decrease in right low back, hip and calf pain. Pt requires vc's for correct performance of exercises, motivated and compliant with HEP. Passive lumbar stretch with pt in left side lying. IASTM right lumbar paraspinals and over right SI, good response. Noted significant proximal weakness in LLE, (opposite of painful hip) Assess response to IASTM. Patients progression toward goals is as follows:  [x]     Improving appropriately and progressing toward goals  []     Improving slowly and progressing toward goals  []     Not making progress toward goals and plan of care will be adjusted    PLAN OF CARE:   Patient continues to benefit from skilled intervention to address the above impairments.     [x] Continue treatment per established plan of care. []     Recommend the following changes to the plan of care:     Recommendations/Intent for next treatment: prone mob./stm right gluteal muscles. Try right LE distraction, check leg length discrepancy.       Keiko Aguirre, PT   Time Calculation: 55 mins  Patient Time in clinic:   Start Time: [address]   Stop Time: 9829

## 2018-08-27 ENCOUNTER — HOSPITAL ENCOUNTER (OUTPATIENT)
Dept: PHYSICAL THERAPY | Age: 68
Discharge: HOME | End: 2018-08-27
Payer: MEDICARE

## 2018-08-27 PROCEDURE — 97110 THERAPEUTIC EXERCISES: CPT

## 2018-08-27 PROCEDURE — 97140 MANUAL THERAPY 1/> REGIONS: CPT

## 2018-08-27 NOTE — PROGRESS NOTES
[facility]  [address], [address] [facility]    OUTPATIENT physical Therapy DAILY Treatment NOTe  Visit: 10    NAME: Kaia Teran AGE: 77 y.o. GENDER: male  DATE: 8/27/2018  REFERRING PHYSICIAN: Saintclair Shope, MD        GOALS  Short term goals  Time frame: 3 weeks  1. Patient will be compliance and independent with the initial HEP as evidenced by being able to perform without cuing. 2. Patient will report a 50% improvement in symptoms. 3. Patient report a 50% improvement in sleeping. 4. Patient will tolerate 15 minutes of clinic activities before onset of symptoms. Long term goals  Time frame: 6 weeks  1. Patient will reports pain level decreased to 2/10 to allow increased ease of movement. 2. Patient will have an improved score on the TXU Felipe outcome measure by 10 points to demonstrate an increase in functional activity tolerance. 3. Patient will be independent in final individualized HEP. 4. Patient will return to walking without being limited by symptoms. 5. Patient will sleep 6-8 hours without being interrupted by pain. SUBJECTIVE:   I've had one or two adk days in the last week  Pain In: 4/10  9/5 meeting with Dr Natalio Wakefield, Had MRI of low back Saturday, will have EMG B/L LE 9/4    OBJECTIVE DATA SUMMARY:     Pain:  Location:  Quality: aching  Now: 6/10  Best:  Worst:  Factors that improve pain: rest sitting    Posture:    WNL    Strength:   Right knee extension 3+/5, left knee ext 5/5    Range of Motion:   Lumbar   Ext 25 % of normal into left quadrant  Right side bending  25% of naormalpainful    Spinal Assessment:   Flattened lumbar spine      Joint Mobility:   Hypomobile lumbar spinal segments with P-A pressure    Palpation:   TTP right quadratus, lumbar paraspinals, gluteals, piriformis, gastroc    Neurologic Assessment:   Tone: WNL   Sensation: WNL   Reflexes: not tested, test next visit    Special Tests:   - SLR  + slump test on right    Mobility:   Transitional Movements: Antalgic   Gait: WNL    Balance: WNL    Functional Measure: In compliance with CMSs Claims Based Outcome Reporting, the following G-code set was chosen for this patient based on their primary functional limitation being treated: The outcome measure chosen to determine the severity of the functional limitation was the TXU Felipe with a score of 11/24 which was correlated with the impairment scale. 8/27/2018The outcome measure chosen to determine the severity of the functional limitation was the TXU Felipe with a score of 5/24 which was correlated with the impairment scale.          ? Mobility - Walking and Moving Around:     - CURRENT STATUS: CK - 40%-59% impaired, limited or restricted    - GOAL STATUS: CI - 1%-19% impaired, limited or restricted    - D/C STATUS:  ---------------To be determined---------------      Physical Therapy Evaluation Charge Determination   History Examination Presentation Decision-Making   LOW Complexity : Zero comorbidities / personal factors that will impact the outcome / POC LOW Complexity : 1-2 Standardized tests and measures addressing body structure, function, activity limitation and / or participation in recreation  LOW Complexity : Stable, uncomplicated  LOW Complexity : FOTO score of       Based on the above components, the patient evaluation is determined to be of the following complexity level: LOW     TREATMENT/INTERVENTION:  Modalities (Rationale):   MHP post treatment with ES to decrease pain and muscle guarding    Therapeutic Exercises:  HEP   LTR, KTC, Piriformis stretch supine, PPT,Bridges, HS stretch,  gastroc stretch    Clinic Activities    Piriformis figure 4 stretch 3 reps with 20 second hold   PPT 5 reps with 15 sec hold  BKTC 10 reps 2 sets  Bridges with 10# x 10 reps 2 sets  Single leg lumbar rotation x 4 reps 20 sec hold  B/L SLR with 3# x 10 reps 2 sets    All 4's  Angry Cat 8 reps  Child's pose  LE extension  X 15 reps each side    Prone  Elbow prop 20 sec, painful     Flexion/ext over back of chair x 7 reps  Seated side bends x 7 reps  Seated rotation x 2 reps 30 sec hold B/L    Standing  B/L hip extension and abduction with greenTB x 10 reps  2 sets( proximal hip weakness, left > right)  Standing trunk extension with 10 sec hold  HS stretches 3 reps with 15 sec hold  Gatroc stretches 3 reps with 20 sec hold  Side steps with green TB 60'    UBE/LBE x 5 min resistance of 4    Manual Therapy:  IASTM right lumbar paraspinals L3-L5, quadratus lumborum   Pt expressed understanding of the procedure and visualized response. .  STM to right piriformis and gluteals with weighted green ball    Prone P-A mob of lumbar spine Grade 3  Left side lying, right lumbar gapping 3 reps 20 sec stretch. Left side lying lumbar passive rotation stretch    Neuro Re-Education:  Discussed sitting with lumbar support to improve postural alignment    Balance Training:  none    Ambulation/Gait Training:  none    Activity tolerance and post treatment pain report: Tolerated well  Pain Out:     Education:  Education was provided to the patient on the following topics: Importance of exercises. [x]    No changes were made to the home exercise program.  []    The following changes were made to the home exercise program:   Patient verbalized understanding of the topics presented. ASSESSMENT:   Chief Complaint is aching in right LE, calf . Deep soft tissue pressure right L3-L5 paraspinals and quadratus as well as P-A pressure at these levels reproduce right LE symptoms.    . Noted significant proximal weakness in LLE, (opposite of painful hip)    Patients progression toward goals is as follows:  [x]     Improving appropriately and progressing toward goals  []     Improving slowly and progressing toward goals  []     Not making progress toward goals and plan of care will be adjusted    PLAN OF CARE:   Patient continues to benefit from skilled intervention to address the above impairments. [x]    Continue treatment per established plan of care. []     Recommend the following changes to the plan of care:     Recommendations/Intent for next treatment:  Try right LE distraction, check leg length discrepancy  Demonstrate use of tennis ball for soft tissue pressure at L3-L5 paraspinals.  Lauryn Roberson, PT   Time Calculation: 35 mins  Patient Time in clinic:   Start Time: 5591   Stop Time: 0830

## 2018-09-04 ENCOUNTER — OFFICE VISIT (OUTPATIENT)
Dept: NEUROLOGY | Age: 68
End: 2018-09-04

## 2018-09-04 ENCOUNTER — HOSPITAL ENCOUNTER (OUTPATIENT)
Dept: PHYSICAL THERAPY | Age: 68
Discharge: HOME | End: 2018-09-04
Payer: MEDICARE

## 2018-09-04 DIAGNOSIS — R20.2 PARESTHESIA: ICD-10-CM

## 2018-09-04 DIAGNOSIS — M79.604 PAIN IN BOTH LOWER EXTREMITIES: ICD-10-CM

## 2018-09-04 DIAGNOSIS — M79.605 PAIN IN BOTH LOWER EXTREMITIES: ICD-10-CM

## 2018-09-04 DIAGNOSIS — G62.9 NEUROPATHY: Primary | ICD-10-CM

## 2018-09-04 DIAGNOSIS — M54.16 LUMBAR RADICULOPATHY: ICD-10-CM

## 2018-09-04 PROCEDURE — 97014 ELECTRIC STIMULATION THERAPY: CPT

## 2018-09-04 PROCEDURE — 97140 MANUAL THERAPY 1/> REGIONS: CPT

## 2018-09-04 PROCEDURE — 97110 THERAPEUTIC EXERCISES: CPT

## 2018-09-04 NOTE — PROGRESS NOTES
[address], [address], [address]  p: [phone]  f: [phone]    Test Date:  2018    Patient: Pako Edwards : 1950 Physician: Charisma Raza   Sex: Male Height: 5' 11\" Ref Phys:    ID#: [id number] Weight: 191.01 lbs. Technician: Daysi Harmon     Patient Complaints:  b/l le numbness and tingling sensation, weakness and low back pain    Medications: See chart      Patient History / Exam: This is a 28-year-old right-handed black man who is being evaluated for numbness tingling 8year-old extremities, low back pain and weakness. NCV & EMG Findings:  Evaluation of the left Fibular motor nerve showed decreased conduction velocity (Poplt-B Fib, 32 m/s). The right Fibular motor nerve showed reduced amplitude (1.9 mV) and decreased conduction velocity (B Fib-Ankle, 36 m/s). The right tibial motor nerve showed no response (Ankle). The left Sup Fibular sensory and the right Sup Fibular sensory nerves showed no response (14 cm). The left sural sensory and the right sural sensory nerves showed no response (Calf). All remaining nerves (as indicated in the following tables) were within normal limits. Left vs. Right side comparison data for the Fibular motor nerve indicates abnormal L-R velocity difference (Poplt-B Fib, 30 m/s). All examined muscles (as indicated in the following table) showed no evidence of electrical instability. Impression: This is a grossly abnormal study. 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 Radiculopathy cannot be excluded. Recommendations: Neuroimaging of the lumbosacral spine suggested.   If clinically indicated patient may benefit from IVIG.      ___________________________          Nerve Conduction Studies  Anti Sensory Summary Table     Stim Site NR Peak (ms) Norm Peak (ms) P-T Amp (µV) Norm P-T Amp Site1 Site2 Delta-P (ms) Dist (cm) Josh (m/s) Norm Josh (m/s)   Left Sup Fibular Anti Sensory (Ant Lat Mall)  22.6°C   14 cm NR  <4.4  >5.0 14 cm Ant Lat Mall  14.0  >32   Right Sup Fibular Anti Sensory (Ant Lat Mall)  22.7°C   14 cm NR  <4.4  >5.0 14 cm Ant Lat Mall  14.0  >32   Left Sural Anti Sensory (Lat Mall)  22.6°C   Calf NR  <4.0  >5.0 Calf Lat Mall  14.0  >35   Right Sural Anti Sensory (Lat Mall)  22.7°C   Calf NR  <4.0  >5.0 Calf Lat Mall  14.0  >35     Motor Summary Table     Stim Site NR Onset (ms) Norm Onset (ms) O-P Amp (mV) Norm O-P Amp Site1 Site2 Delta-0 (ms) Dist (cm) Josh (m/s) Norm Josh (m/s)   Left Fibular Motor (Ext Dig Brev)  22.7°C   Ankle    4.1 <6.1 2.7 >2.5 B Fib Ankle 8.4 35.0 42 >38   B Fib    12.5  2.2  Poplt B Fib 3.1 10.0 32 >40   Poplt    15.6  1.5          Right Fibular Motor (Ext Dig Brev)  22.4°C   Ankle    4.2 <6.1 1.9 >2.5 B Fib Ankle 9.5 34.0 36 >38   B Fib    13.7  1.6  Poplt B Fib 1.6 10.0 62 >40   Poplt    15.3  1.6          Left Tibial Motor (Abd Gagnon Brev)  22.7°C   Ankle    4.6 <6.1 3.6 >3.0 Knee Ankle 9.9 35.0 35 >35   Knee    14.5  2.1          Right Tibial Motor (Abd Gagnon Brev)  22.5°C   Ankle NR  <6.1  >3.0 Knee Ankle  35.0  >35   Knee    15.9  2.1            EMG+     Side Muscle Nerve Root Ins Act Fibs Psw Amp Dur Poly Recrt Int Minetta Oyster Comment   Right VastusMed Femoral L2-4 Nml Nml Nml Nml Nml 0 Nml Nml    Right VastusLat Femoral L2-4 Nml Nml Nml Nml Nml 0 Nml Nml    Right QuadratusFem QuadFemoris L4-5, S1 Nml Nml Nml Nml Nml 0 Nml Nml    Right AntTibialis Dp Br Fibular L4-5 Nml Nml Nml Nml Nml 0 Nml Nml    Right Fibularis Brev Sup Br Fibular L5-S1 Nml Nml Nml Nml Nml 0 Nml Nml        Nerve Conduction Studies  Anti Sensory Left/Right Comparison     Stim Site L Lat (ms) R Lat (ms) L-R Lat (ms) L Amp (µV) R Amp (µV) L-R Amp (%) Site1 Site2 L Josh (m/s) R Josh (m/s) L-R Josh (m/s)   Sup Fibular Anti Sensory (Ant Lat Mall)  22.6°C   14 cm       14 cm Ant Lat Mall      Sural Anti Sensory (Lat Mall)  22.6°C   Calf Calf Lat Mall        Motor Left/Right Comparison     Stim Site L Lat (ms) R Lat (ms) L-R Lat (ms) L Amp (mV) R Amp (mV) L-R Amp (%) Site1 Site2 L Josh (m/s) R Josh (m/s) L-R Josh (m/s)   Fibular Motor (Ext Dig Brev)  22.7°C   Ankle 4.1 4.2 0.1 2.7 1.9 29.6 B Fib Ankle 42 36 6   B Fib 12.5 13.7 1.2 2.2 1.6 27.3 Poplt B Fib 32 62 30   Poplt 15.6 15.3 0.3 1.5 1.6 6.3        Tibial Motor (Abd Gagnon Brev)  22.7°C   Ankle 4.6   3.6   Knee Ankle 35     Knee 14.5 15.9 1.4 2.1 2.1 0.0              Waveforms:

## 2018-09-04 NOTE — PROGRESS NOTES
[facility], [address] [facility] OUTPATIENT physical Therapy DAILY Treatment NOTe Visit: 11 NAME: Adiel  AGE: 77 y.o. GENDER: male DATE: 2018 REFERRING PHYSICIAN: Dalia Haley MD 
 
 
 
GOALS Short term goals Time frame: 3 weeks 1. Patient will be compliance and independent with the initial HEP as evidenced by being able to perform without cuing. 2. Patient will report a 50% improvement in symptoms. 3. Patient report a 50% improvement in sleeping. 4. Patient will tolerate 15 minutes of clinic activities before onset of symptoms. Long term goals Time frame: 6 weeks 1. Patient will reports pain level decreased to 2/10 to allow increased ease of movement. 2. Patient will have an improved score on the TXU Felipe outcome measure by 10 points to demonstrate an increase in functional activity tolerance. 3. Patient will be independent in final individualized HEP. 4. Patient will return to walking without being limited by symptoms. 5. Patient will sleep 6-8 hours without being interrupted by pain. SUBJECTIVE:  
I've had one or two adk days in the last week Pain In: 4/10 
9/5 meeting with Dr Bertrand Galvin, Had MRI of low back Saturday, will have EMG B/L LE  OBJECTIVE DATA SUMMARY:  
 
Pain: 
Location: 
Quality: aching Now: 6/10 Best: Worst: 
Factors that improve pain: rest sitting Posture: WNL Strength:  
Right knee extension 3+/5, left knee ext 5/5 Range of Motion:  
Lumbar Ext 25 % of normal into left quadrant Right side bending  25% of naormalpainful Spinal Assessment:  
Flattened lumbar spine Joint Mobility: Hypomobile lumbar spinal segments with P-A pressure Palpation:  
TTP right quadratus, lumbar paraspinals, gluteals, piriformis, gastroc Neurologic Assessment: 
 Tone: WNL Sensation: WNL Reflexes: not tested, test next visit Special Tests:  
- SLR 
+ slump test on right Mobility: 
 Transitional Movements: Antalgic Gait: WNL Balance: WNL Functional Measure: In compliance with CMSs Claims Based Outcome Reporting, the following G-code set was chosen for this patient based on their primary functional limitation being treated: The outcome measure chosen to determine the severity of the functional limitation was the Marisela Newville with a score of 11/24 which was correlated with the impairment scale. 8/27/2018The outcome measure chosen to determine the severity of the functional limitation was the Marisela Newville with a score of 5/24 which was correlated with the impairment scale. ? Mobility - Walking and Moving Around:  
  - CURRENT STATUS: CK - 40%-59% impaired, limited or restricted  - GOAL STATUS: CI - 1%-19% impaired, limited or restricted  - D/C STATUS:  ---------------To be determined--------------- Physical Therapy Evaluation Charge Determination History Examination Presentation Decision-Making LOW Complexity : Zero comorbidities / personal factors that will impact the outcome / POC LOW Complexity : 1-2 Standardized tests and measures addressing body structure, function, activity limitation and / or participation in recreation  LOW Complexity : Stable, uncomplicated  LOW Complexity : FOTO score of  Based on the above components, the patient evaluation is determined to be of the following complexity level: LOW  
 
TREATMENT/INTERVENTION: 
Modalities (Rationale): MHP post treatment with ES to decrease pain and muscle guarding Therapeutic Exercises: HEP   LTR, KTC, Piriformis stretch supine, PPT,Bridges, HS stretch,  gastroc stretch Clinic Activities Piriformis figure 4 stretch 3 reps with 20 second hold PPT 5 reps with 15 sec hold BKTC 10 reps 2 sets Bridges with 10# x 10 reps 2 sets Single leg lumbar rotation x 4 reps 20 sec hold (leg crossed over) B/L SLR with 4# x 10 reps 2 sets All 4's Angry Cat 8 reps Child's pose LE extension  X 15 reps each side Prone Elbow prop 20 sec, painful Flexion/ext over back of chair x 7 reps Seated side bends x 7 reps Seated rotation x 2 reps 30 sec hold B/L Standing B/L hip extension and abduction with greenTB x 10 reps  2 sets( proximal hip weakness, left > right) Standing trunk extension with 10 sec hold HS stretches 3 reps with 15 sec hold Gatroc stretches 3 reps with 20 sec hold Side steps with green TB 60' UBE/LBE x 10 min resistance of 4 Manual Therapy: 
IASTM right lumbar paraspinals L3-L5, quadratus lumborum Pt expressed understanding of the procedure and visualized response. Jerome Osborne STM to right piriformis and gluteals with weighted green ball Prone P-A mob of lumbar spine Grade 3 Left side lying, right lumbar gapping 3 reps 20 sec stretch. Left side lying lumbar passive rotation stretch Neuro Re-Education: 
Discussed sitting with lumbar support to improve postural alignment Balance Training: 
none Ambulation/Gait Training: 
none Activity tolerance and post treatment pain report: Tolerated well Pain Out:  
 
Education: 
Education was provided to the patient on the following topics: Importance of exercises. [x]    No changes were made to the home exercise program. 
[]    The following changes were made to the home exercise program:  
Patient verbalized understanding of the topics presented. ASSESSMENT:  
Chief Complaint is aching in right LE, calf . Not everyday. Deep soft tissue pressure right L3-L5 paraspinals and quadratus as well as P-A pressure at these levels reproduce right LE symptoms. . Noted significant proximal weakness in LLE, (opposite of painful hip) Pt to have LE EMG today and see Dr Magali Mora tomorrow. Also will get MRI results Patients progression toward goals is as follows: 
[x]     Improving appropriately and progressing toward goals 
[]     Improving slowly and progressing toward goals []     Not making progress toward goals and plan of care will be adjusted PLAN OF CARE:  
Patient continues to benefit from skilled intervention to address the above impairments. [x]    Continue treatment per established plan of care. []     Recommend the following changes to the plan of care:  
 
Recommendations/Intent for next treatment:  Try right LE distraction, check leg length discrepancy  Demonstrate use of tennis ball for soft tissue pressure at L3-L5 paraspinals. Brittney Harris, PT Time Calculation: 50 mins Patient Time in clinic: 
 Start Time: 3995 Stop Time: 5892

## 2018-09-05 ENCOUNTER — OFFICE VISIT (OUTPATIENT)
Dept: NEUROLOGY | Age: 68
End: 2018-09-05

## 2018-09-05 VITALS
HEART RATE: 71 BPM | BODY MASS INDEX: 26.07 KG/M2 | DIASTOLIC BLOOD PRESSURE: 55 MMHG | SYSTOLIC BLOOD PRESSURE: 136 MMHG | HEIGHT: 71 IN | TEMPERATURE: 97.7 F | RESPIRATION RATE: 16 BRPM | OXYGEN SATURATION: 98 % | WEIGHT: 186.2 LBS

## 2018-09-05 DIAGNOSIS — M48.061 SPINAL STENOSIS OF LUMBAR REGION, UNSPECIFIED WHETHER NEUROGENIC CLAUDICATION PRESENT: ICD-10-CM

## 2018-09-05 DIAGNOSIS — M54.16 LUMBAR RADICULOPATHY: ICD-10-CM

## 2018-09-05 DIAGNOSIS — G62.9 POLYNEUROPATHY: Primary | ICD-10-CM

## 2018-09-05 DIAGNOSIS — G89.4 CHRONIC PAIN SYNDROME: ICD-10-CM

## 2018-09-05 DIAGNOSIS — G56.03 BILATERAL CARPAL TUNNEL SYNDROME: ICD-10-CM

## 2018-09-05 RX ORDER — OXYCODONE AND ACETAMINOPHEN 5; 325 MG/1; MG/1
1 TABLET ORAL
Qty: 40 TAB | Refills: 0 | Status: SHIPPED | OUTPATIENT
Start: 2018-09-05 | End: 2019-01-21 | Stop reason: SDUPTHER

## 2018-09-05 NOTE — MR AVS SNAPSHOT
[address] [place] [address] 
[phone] Patient: Rush Weston MRN: [id number] Visit Information Date & Time Provider Department Dept. Phone Encounter #  
 9/5/2018  2:20 PM Cortes Tsai MD [facility] at [address] [place] [address] [id number] Follow-up Instructions Return in about 3 months (around 12/5/2018). Upcoming Health Maintenance Date Due Hepatitis C Screening 1950 DTaP/Tdap/Td series (1 - Tdap) 3/24/1969 FOBT Q 1 YEAR AGE 50-75 3/24/1998 ZOSTER VACCINE AGE 60> 1/24/2008 GLAUCOMA SCREENING Q2Y 3/24/2013 Pneumococcal 65+ Low/Medium Risk (1 of 2 - PCV13) 3/24/2013 MEDICARE YEARLY EXAM 3/20/2018 Influenza Age 5 to Adult 8/1/2018 Allergies as of 9/5/2018  Review Complete On: 9/5/2018 By: Patricia Whelan MD  
 No Known Allergies Current Immunizations  Never Reviewed No immunizations on file. Not reviewed this visit You Were Diagnosed With   
  
 Codes Comments Polyneuropathy    -  Primary ICD-10-CM: G62.9 ICD-9-CM: 356.9 Bilateral carpal tunnel syndrome     ICD-10-CM: G56.03 
ICD-9-CM: 354.0 Lumbar radiculopathy     ICD-10-CM: M54.16 
ICD-9-CM: 724.4 Spinal stenosis of lumbar region, unspecified whether neurogenic claudication present     ICD-10-CM: M48.061 
ICD-9-CM: 724.02 Chronic pain syndrome     ICD-10-CM: G89.4 ICD-9-CM: 338. 4 Vitals BP Pulse Temp Resp Height(growth percentile) 136/55 (BP 1 Location: Left arm, BP Patient Position: Sitting) 71 97.7 °F (36.5 °C) (Temporal) 16 5' 11\" (1.803 m) Weight(growth percentile) SpO2 BMI Smoking Status 186 lb 3.2 oz (84.5 kg) 98% 25.97 kg/m2 Former Smoker BMI and BSA Data Body Mass Index Body Surface Area  
 25.97 kg/m 2 2.06 m 2 Preferred Pharmacy Pharmacy Name Phone [address] Natasha Blanco [phone] Your Updated Medication List  
  
   
This list is accurate as of 9/5/18  3:37 PM.  Always use your most recent med list. amLODIPine 5 mg tablet Commonly known as:  Liset Darting Take 5 mg by mouth daily. ASPIRIN PO Take 81 mg by mouth daily. Cholecalciferol (Vitamin D3) 2,000 unit Cap capsule Commonly known as:  VITAMIN D3 Take 2,000 Units by mouth daily. FLOMAX 0.4 mg capsule Generic drug:  tamsulosin Take 0.4 mg by mouth daily. glipiZIDE 5 mg tablet Commonly known as:  Olevia Handy Take 5 mg by mouth two (2) times a day. glucophage  mg tablet Generic drug:  metFORMIN ER Take 1,000 mg by mouth two (2) times a day. labetalol 300 mg tablet Commonly known as:  Lennis Miles Take 900 mg by mouth two (2) times a day. lisinopril 20 mg tablet Commonly known as:  Marilynne Shows Take 20 mg by mouth daily. lovastatin 20 mg tablet Commonly known as:  MEVACOR Take 20 mg by mouth nightly. NEXIUM PO Take 22.5 mg by mouth daily. OTHER Hand brace Bilateral cts  
  
 oxyCODONE-acetaminophen 5-325 mg per tablet Commonly known as:  PERCOCET Take 1 Tab by mouth every eight (8) hours as needed for Pain. Max Daily Amount: 3 Tabs. POTASSIUM CHLORIDE PO Take 20 mEq by mouth daily. ER micro  
  
 triamterene-hydroCHLOROthiazide 37.5-25 mg per tablet Commonly known as:  [place] Take 1 Tab by mouth daily. VITAMIN B-12 1,000 mcg tablet Generic drug:  cyanocobalamin Take 1,000 mcg by mouth daily. Prescriptions Printed Refills  
 oxyCODONE-acetaminophen (PERCOCET) 5-325 mg per tablet 0 Sig: Take 1 Tab by mouth every eight (8) hours as needed for Pain. Max Daily Amount: 3 Tabs. Class: Print Route: Oral  
 OTHER 0 Sig: Hand brace Bilateral cts Class: Print Follow-up Instructions Return in about 3 months (around 12/5/2018). Introducing [facility]! [facility] introduces [employer] patient portal. Now you can access parts of your medical record, email your doctor's office, and request medication refills online. 1. In your internet browser, go to [email or web address]¢[email or web address]¬â„¢[email or web address] 2. Click on the First Time User? Click Here link in the Sign In box. You will see the New Member Sign Up page. 3. Enter your [employer] Access Code exactly as it appears below. You will not need to use this code after youve completed the sign-up process. If you do not sign up before the expiration date, you must request a new code. · [employer] Access Code: [email or web address] Expires: 9/18/2018  9:11 AM 
 
4. Enter the last four digits of your Social Security Number (xxxx) and Date of Birth (mm/dd/yyyy) as indicated and click Submit. You will be taken to the next sign-up page. 5. Create a [employer] ID. This will be your [employer] login ID and cannot be changed, so think of one that is secure and easy to remember. 6. Create a [employer] password. You can change your password at any time. 7. Enter your Password Reset Question and Answer. This can be used at a later time if you forget your password. 8. Enter your e-mail address. You will receive e-mail notification when new information is available in [address] 9. Click Sign Up. You can now view and download portions of your medical record. 10. Click the Download Summary menu link to download a portable copy of your medical information. If you have questions, please visit the Frequently Asked Questions section of the [employer] website. Remember, [employer] is NOT to be used for urgent needs. For medical emergencies, dial 911. Now available from your iPhone and Android! Please provide this summary of care documentation to your next provider. Your primary care clinician is listed as John Rodriguez.  If you have any questions after today's visit, please call [phone].

## 2018-09-05 NOTE — PROGRESS NOTES
Chief Complaint Patient presents with  Neuropathy  Results EMG and MRI 1. Have you been to the ER, urgent care clinic since your last visit? Hospitalized since your last visit? no 
 
2. Have you seen or consulted any other health care providers outside of the [facility] since your last visit? Include any pap smears or colon screening. Dr. House [place]

## 2018-09-10 ENCOUNTER — HOSPITAL ENCOUNTER (OUTPATIENT)
Dept: PHYSICAL THERAPY | Age: 68
Discharge: HOME | End: 2018-09-10
Payer: MEDICARE

## 2018-09-10 PROCEDURE — 97014 ELECTRIC STIMULATION THERAPY: CPT

## 2018-09-10 PROCEDURE — 97110 THERAPEUTIC EXERCISES: CPT

## 2018-09-10 NOTE — PROGRESS NOTES
[address] OUTPATIENT physical Therapy DAILY Treatment NOTe with Discharge Visit: 12 NAME: Ezio Avalos AGE: 77 y.o. GENDER: male DATE: 9/10/2018 REFERRING PHYSICIAN: Alexander Randhawa MD 
 
 
 
GOALS Short term goals Time frame: 3 weeks 1. Patient will be compliance and independent with the initial HEP as evidenced by being able to perform without cuing. 2. Patient will report a 50% improvement in symptoms. 3. Patient report a 50% improvement in sleeping. 4. Patient will tolerate 15 minutes of clinic activities before onset of symptoms. Long term goals Time frame: 6 weeks 1. Patient will reports pain level decreased to 2/10 to allow increased ease of movement. 2. Patient will have an improved score on the TXU Felipe outcome measure by 10 points to demonstrate an increase in functional activity tolerance. 3. Patient will be independent in final individualized HEP. 4. Patient will return to walking without being limited by symptoms. 5. Patient will sleep 6-8 hours without being interrupted by pain. SUBJECTIVE:  
I' 
Pain In: 3/10, not everyday 9/5 meeting with Dr Damon Jennings,  Report of bulging discs lower lumbar spine. Dr Damon Jennings recommends d/c Therapy a this time OBJECTIVE DATA SUMMARY:  
 
Pain: 
Location: 
Quality: aching Now: 6/10 Best: Worst: 
Factors that improve pain: rest sitting Posture: WNL Strength:  
Right knee extension 3+/5, left knee ext 5/5 Range of Motion:  
Lumbar Ext 25 % of normal into left quadrant Right side bending  25% of naormalpainful Spinal Assessment:  
Flattened lumbar spine Joint Mobility: Hypomobile lumbar spinal segments with P-A pressure Palpation:  
TTP right quadratus, lumbar paraspinals, gluteals, piriformis, gastroc Neurologic Assessment: 
 Tone: WNL Sensation: WNL Reflexes: not tested, test next visit Special Tests:  
- SLR 
 + slump test on right Mobility: 
 Transitional Movements: Antalgic Gait: WNL Balance: WNL Functional Measure: In compliance with CMSs Claims Based Outcome Reporting, the following G-code set was chosen for this patient based on their primary functional limitation being treated: The outcome measure chosen to determine the severity of the functional limitation was the TXU Felipe with a score of 11/24 which was correlated with the impairment scale. 8/27/2018The outcome measure chosen to determine the severity of the functional limitation was the TXU Felipe with a score of 5/24 which was correlated with the impairment scale. ? Mobility - Walking and Moving Around:  
  - CURRENT STATUS: CK - 40%-59% impaired, limited or restricted  - GOAL STATUS: CI - 1%-19% impaired, limited or restricted  - D/C STATUS:  ---------------To be determined--------------- Physical Therapy Evaluation Charge Determination History Examination Presentation Decision-Making LOW Complexity : Zero comorbidities / personal factors that will impact the outcome / POC LOW Complexity : 1-2 Standardized tests and measures addressing body structure, function, activity limitation and / or participation in recreation  LOW Complexity : Stable, uncomplicated  LOW Complexity : FOTO score of  Based on the above components, the patient evaluation is determined to be of the following complexity level: LOW  
 
TREATMENT/INTERVENTION: 
Modalities (Rationale): MHP post treatment with ES to decrease pain and muscle guarding Therapeutic Exercises: HEP   LTR, KTC, Piriformis stretch supine, PPT,Bridges, HS stretch,  gastroc stretch Clinic Activities Piriformis figure 4 stretch 3 reps with 20 second hold PPT 5 reps with 15 sec hold BKTC 10 reps 2 sets Bridges with 10# x 10 reps 2 sets Single leg lumbar rotation x 4 reps 20 sec hold (leg crossed over) B/L SLR with 4# x 10 reps 2 sets All 4's Angry Cat 8 reps Child's pose LE extension  X 15 reps each side Prone Elbow prop 20 sec, painful Flexion/ext over back of chair x 7 reps Seated side bends x 7 reps Seated rotation x 2 reps 30 sec hold B/L Standing B/L hip extension and abduction with greenTB x 10 reps  2 sets( proximal hip weakness, left > right) HS stretches 3 reps with 15 sec hold Gatroc stretches 3 reps with 20 sec hold Side steps with green TB 60' UBE/LBE x 10 min resistance of 4 Manual Therapy: 
IASTM right lumbar paraspinals L3-L5, quadratus lumborum Pt expressed understanding of the procedure and visualized response. Diane Betsy STM to right piriformis and gluteals with weighted green ball Prone P-A mob of lumbar spine Grade 3 Left side lying, right lumbar gapping 3 reps 20 sec stretch. Left side lying lumbar passive rotation stretch Neuro Re-Education: 
Discussed sitting with lumbar support to improve postural alignment Balance Training: 
none Ambulation/Gait Training: 
none Activity tolerance and post treatment pain report: Tolerated well Pain Out:  
 
Education: 
Education was provided to the patient on the following topics: Importance of exercises. [x]    No changes were made to the home exercise program. 
[]    The following changes were made to the home exercise program:  
Patient verbalized understanding of the topics presented. ASSESSMENT:  
Chief Complaint is aching in right LE, calf . Not everyday. MRI report shows bulging disc in lower lumbar spine, Dr Nuria Aden recommends d/c therapy. Noted significant decrease in pain since beginning Therapy, less medicine required to control pain. Reviewed comprehensive HEP  and encouraged overall fitness ie walking program or stationary bike. Patients progression toward goals is as follows: 
[x]     Improving appropriately and progressing toward goals []     Improving slowly and progressing toward goals 
[]     Not making progress toward goals and plan of care will be adjusted PLAN OF CARE:  
 
[]    Continue treatment per established plan of care. [x]     Recommend the following changes to the plan of care: Discharge Raina Ulloa, PT Time Calculation: 50 mins Patient Time in clinic: 
 Start Time: 0800 Stop Time: 8882

## 2018-09-29 NOTE — PROGRESS NOTES
Neurology Progress Note NAME:  Daja Barakat :   1950 MRN:   [id number] Date/Time:  2018 Subjective:  
  
Daja Barakat is a 77 y.o. male here today for follow-up for neuropathy, back pain, radiculopathy, test results. Patient says he has been attending physical therapy as prescribed, and that has helped with the back pain, however, he still experiences sharp pain going down the legs from the low back. Pain is persistent, burning sensation that goes down to the legs, also numbness and tingling sensation of the legs and also weakness of the legs. He thinks that since the physical therapy been some improvement in his strength. He also has numbness and tingling sensation of the arms that goes down to the hands causing some weakness of the hands, sometimes has some difficulty holding on for times. MRI of the lumbosacral spine reviewed with patient which showed multilevel foraminal narrowing moderate central stenosis. EMG nerve conduction studies of the upper extremity showed bilateral carpal tunnel syndrome and ulnar neuropathy, lower extremity showed sensorimotor polyneuropathy with evidence of demyelination. 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 I discussed extensively with patient to consider different modalities of management, he may need carpal tunnel release, IVIG treatment and surgical evaluation. Patient says he will think about the options. Review of Systems - General ROS: positive for  - fatigue and sleep disturbance Psychological ROS: positive for - anxiety and sleep disturbances Ophthalmic ROS: positive for - decreased vision and uses glasses ENT ROS: negative Allergy and Immunology ROS: negative Hematological and Lymphatic ROS: negative Endocrine ROS: negative Respiratory ROS: no cough, shortness of breath, or wheezing Cardiovascular ROS: no chest pain or dyspnea on exertion Gastrointestinal ROS: no abdominal pain, change in bowel habits, or black or bloody stools Genito-Urinary ROS: no dysuria, trouble voiding, or hematuria Musculoskeletal ROS: positive for - gait disturbance, joint pain, joint stiffness, joint swelling, muscle pain and muscular weakness Neurological ROS: positive for - dizziness, gait disturbance, impaired coordination/balance, numbness/tingling, visual changes and weakness Dermatological ROS: negative Medications reviewed: 
Current Outpatient Prescriptions Medication Sig Dispense Refill  oxyCODONE-acetaminophen (PERCOCET) 5-325 mg per tablet Take 1 Tab by mouth every eight (8) hours as needed for Pain. Max Daily Amount: 3 Tabs. 40 Tab 0  
 OTHER Hand brace Bilateral cts 2 Units 0  
 metFORMIN ER (GLUCOPHAGE XR) 500 mg tablet Take 1,000 mg by mouth two (2) times a day.  lisinopril (PRINIVIL, ZESTRIL) 20 mg tablet Take 20 mg by mouth daily.  triamterene-hydroCHLOROthiazide (MAXZIDE) 37.5-25 mg per tablet Take 1 Tab by mouth daily.  ASPIRIN PO Take 81 mg by mouth daily.  amLODIPine (NORVASC) 5 mg tablet Take 5 mg by mouth daily.  POTASSIUM CHLORIDE PO Take 20 mEq by mouth daily. ER micro  esomeprazole magnesium (NEXIUM PO) Take 22.5 mg by mouth daily.  Cholecalciferol, Vitamin D3, (VITAMIN D3) 2,000 unit cap capsule Take 2,000 Units by mouth daily.  cyanocobalamin (VITAMIN B-12) 1,000 mcg tablet Take 1,000 mcg by mouth daily.  labetalol (NORMODYNE) 300 mg tablet Take 900 mg by mouth two (2) times a day.  glipiZIDE (GLUCOTROL) 5 mg tablet Take 5 mg by mouth two (2) times a day.  tamsulosin (FLOMAX) 0.4 mg capsule Take 0.4 mg by mouth daily.  lovastatin (MEVACOR) 20 mg tablet Take 20 mg by mouth nightly. Objective:  
Vitals: 
Vitals:  
 09/05/18 1441 BP: 136/55 Pulse: 71 Resp: 16 Temp: 97.7 °F (36.5 °C) TempSrc: Temporal  
SpO2: 98% Weight: 186 lb 3.2 oz (84.5 kg) Height: 5' 11\" (1.803 m) PainSc:   5 PainLoc: Back Lab Data Reviewed: 
Lab Results Component Value Date/Time WBC 11.0 09/05/2016 09:41 AM  
 HCT 31.4 (L) 09/05/2016 09:41 AM  
 HGB 11.1 (L) 09/05/2016 09:41 AM  
 PLATELET 353 79/24/5644 09:41 AM  
 
 
Lab Results Component Value Date/Time Sodium 137 09/05/2016 09:41 AM  
 Potassium 3.3 (L) 09/05/2016 09:41 AM  
 Chloride 101 09/05/2016 09:41 AM  
 CO2 30 09/05/2016 09:41 AM  
 Glucose 158 (H) 09/05/2016 09:41 AM  
 BUN 17 09/05/2016 09:41 AM  
 Creatinine 1.06 09/05/2016 09:41 AM  
 Calcium 9.1 09/05/2016 09:41 AM  
 
 
No components found for: [employer] No results found for: LAUREN No results found for: HBA1C, HGBE8, SOA8AOFW, OIS3SCWG Lab Results Component Value Date/Time Vitamin B12 599 07/11/2018 09:15 AM  
 
 
No results found for: LAUREN, Beth Benes No results found for: CHOL, CHOLPOCT, CHOLX, CHLST, CHOLV, HDL, HDLPOC, LDL, LDLCPOC, LDLC, DLDLP, VLDLC, VLDL, TGLX, TRIGL, TRIGP, TGLPOCT, CHHD, CHHDX 
 
 
CT Results (recent): 
 
Results from Hospital Encounter encounter on 09/05/16 CTA CHEST W WO CONT Narrative INDICATION:  chest pain r/o dissections and EXAM:  CTA Chest with contrast  
 
COMPARISON:  None TECHNIQUE:  Following the uneventful intravenous administration of Iodinated 
contrast, thin helical axial images were obtained through the chest. 3D image 
postprocessing was performed. Coronal and sagittal reformatted images were 
obtained. CT dose reduction was achieved through use of a standardized protocol 
tailored for this examination and automatic exposure control for dose 
modulation. FINDINGS: 
 
Thoracic aorta is normal in caliber without evidence for dissection or 
significant stenosis. Great vessel origins are patent. No evidence for acute pulmonary embolism. Mild to moderate emphysema. Bibasilar atelectasis. No other significant lung 
abnormality. The central airways are patent. Tiny pericardial effusion. No pleural effusion. No thoracic lymphadenopathy. Nonspecific thickening of the adrenal glands. A couple low-density renal lesions are too small to characterize. A tiny 
hyperenhancing focus in the liver are nonspecific (series 3, images 98). No acute osseous abnormality. Degenerative changes in the lower cervical spine 
with grade 1/2 anterolisthesis of C7 on T1. Impression IMPRESSION: 
 
No evidence for thoracic aortic aneurysm or dissection. No evidence for acute 
pulmonary embolism. Mild to moderate emphysema. Tiny pericardial effusion. Nonspecific thickening of the adrenal glands. Tiny hyperenhancing focus in the liver is nonspecific. Degenerative changes in the lower cervical spine with grade 1/2 anterolisthesis 
of C7 on T1. MRI Results (recent): 
 
Results from Hospital Encounter encounter on 07/21/18 MRI LUMB SPINE WO CONT Narrative INDICATION:  Chronic low back pain with right-sided radiculopathy, no trauma EXAMINATION:  MRI LUMBAR SPINE without CONTRAST 
 
COMPARISON: None TECHNIQUE: MR imaging of the lumbar spine was performed with sagittal T1, T2, 
STIR;  axial T1, T2.  NO CONTRAST ADMINISTERED FINDINGS: 
 
Mild retrolisthesis of L4 on L5 and L5 on S1. No other significant malalignment. Moderate to severe L4-5 and L5-S1 degenerative disc disease. No significant bone marrow signal abnormality. No evidence for fracture. The distal cord, conus medullaris, and cauda equina are unremarkable. T12-L1: No significant disc abnormality, central spinal canal stenosis, or 
neural foraminal stenosis. L1/2: Tiny disc bulge causing no central spinal canal or neural foraminal 
stenosis L2/3: Disc osteophyte complex, facet arthropathy, and ligamentum flavum 
hypertrophy causing moderate central stenosis. Left lateral recess stenosis with 
moderate left and mild right neural foraminal stenosis L3/4: Disc osteophyte complex, facet arthropathy, and ligamentum flavum 
hypertrophy causing moderate central stenosis. Bilateral recess stenosis with 
moderate right and moderate left neural foraminal stenosis. L4/5: Disc osteophyte complex, facet arthropathy, and ligamentum flavum 
hypertrophy causing moderate central stenosis. Bilateral recess stenosis with 
moderate to severe right and moderate to severe left neural foraminal stenosis. L5/S1: Disc osteophyte complex with right paracentral focal disc protrusion with 
mild to moderate central stenosis. Bilateral lateral recess stenosis with 
moderate to severe right and moderate left neural foraminal stenosis Impression IMPRESSION: 
 
Moderate central stenosis from L2-3 through L4-5. Multilevel neural foraminal 
narrowing as described above IR Results (recent): No results found for this or any previous visit. VAS/US Results (recent): No results found for this or any previous visit. PHYSICAL EXAM: 
General:    Alert, cooperative, no distress, appears stated age. Head:   Normocephalic, without obvious abnormality, atraumatic. Eyes:   Conjunctivae/corneas clear. PERRLA Nose:  Nares normal. No drainage or sinus tenderness. Throat:    Lips, mucosa, and tongue normal.  No Thrush Neck:  Supple, symmetrical,  no adenopathy, thyroid: non tender 
  no carotid bruit and no JVD. Paraspinal tenderness Back:    Symmetric, bilateral tenderness. Lungs:   Clear to auscultation bilaterally. No Wheezing or Rhonchi. No rales. Chest wall:  No tenderness or deformity. No Accessory muscle use. Heart:   Regular rate and rhythm,  no murmur, rub or gallop. Abdomen:   Soft, non-tender. Not distended. Bowel sounds normal. No masses Extremities: Extremities normal, atraumatic, No cyanosis. No edema. No clubbing Skin:     Texture, turgor normal. No rashes or lesions. Not Jaundiced Lymph nodes: Cervical, supraclavicular normal. 
 Psych:  Good insight. Not depressed. Not anxious or agitated. NEUROLOGICAL EXAM: 
Appearance: The patient is well developed, well nourished, provides a coherent history and is in no acute distress. Mental Status: Oriented to time, place and person. Mood and affect appropriate. Cranial Nerves:   Intact visual fields. Fundi are benign. HECTOR, EOM's full, no nystagmus, no ptosis. Facial sensation is normal. Corneal reflexes are intact. Facial movement is symmetric. Hearing is normal bilaterally. Palate is midline with normal sternocleidomastoid and trapezius muscles are normal. Tongue is midline. Motor:  5-/5 strength in upper and lower proximal and distal muscles. Normal bulk and tone. No fasciculations. Reflexes:   Deep tendon reflexes 2+/4 and symmetrical.  
Sensory:    Decreased sensation to touch, pinprick and vibration. Gait:   Slightly unsteady gait. Tremor:    Mild tremor noted. Cerebellar:  No cerebellar signs present. Neurovascular:  Normal heart sounds and regular rhythm, peripheral pulses intact, and no carotid bruits. Assesment 1. Polyneuropathy Continue management 2. Bilateral carpal tunnel syndrome Conservative management at this time, may consider release 3. Lumbar radiculopathy Continue management 4. Spinal stenosis of lumbar region, unspecified whether neurogenic claudication present Referred to spine specialist 
 
5. Chronic pain syndrome 
 
- oxyCODONE-acetaminophen (PERCOCET) 5-325 mg per tablet; Take 1 Tab by mouth every eight (8) hours as needed for Pain. Max Daily Amount: 3 Tabs. Dispense: 40 Tab; Refill: 0 
 
___________________________________________________ PLAN: Medication and management reviewed with patient ICD-10-CM ICD-9-CM 1. Polyneuropathy G62.9 356.9 2. Bilateral carpal tunnel syndrome G56.03 354.0 3. Lumbar radiculopathy M54.16 724.4 4.  Spinal stenosis of lumbar region, unspecified whether neurogenic claudication present M48.061 724.02   
5. Chronic pain syndrome G89.4 338.4 oxyCODONE-acetaminophen (PERCOCET) 5-325 mg per tablet Follow-up Disposition: 
Return in about 3 months (around 12/5/2018). ___________________________________________________ Total time spent with patient:  []15   []25   []35   [] __ minutes Care Plan discussed with: 
  [x]Patient   []Family    []Care Manager   []Consultant/Specialist : 
 
___________________________________________________ Attending Physician: Gilma Roberts MD

## 2019-01-21 ENCOUNTER — OFFICE VISIT (OUTPATIENT)
Dept: NEUROLOGY | Age: 69
End: 2019-01-21

## 2019-01-21 VITALS
OXYGEN SATURATION: 98 % | HEART RATE: 81 BPM | TEMPERATURE: 98.2 F | BODY MASS INDEX: 27.19 KG/M2 | HEIGHT: 71 IN | WEIGHT: 194.2 LBS | DIASTOLIC BLOOD PRESSURE: 82 MMHG | RESPIRATION RATE: 18 BRPM | SYSTOLIC BLOOD PRESSURE: 133 MMHG

## 2019-01-21 DIAGNOSIS — G62.9 POLYNEUROPATHY: Primary | ICD-10-CM

## 2019-01-21 DIAGNOSIS — G56.03 BILATERAL CARPAL TUNNEL SYNDROME: ICD-10-CM

## 2019-01-21 DIAGNOSIS — G89.4 CHRONIC PAIN SYNDROME: ICD-10-CM

## 2019-01-21 DIAGNOSIS — M47.817 SPONDYLOSIS OF LUMBOSACRAL REGION WITH SPINAL OSTEOARTHRITIS COMPLICATION: ICD-10-CM

## 2019-01-21 DIAGNOSIS — G61.81 CIDP (CHRONIC INFLAMMATORY DEMYELINATING POLYNEUROPATHY) (HCC): ICD-10-CM

## 2019-01-21 RX ORDER — OXYCODONE AND ACETAMINOPHEN 5; 325 MG/1; MG/1
1 TABLET ORAL
Qty: 40 TAB | Refills: 0 | Status: SHIPPED | OUTPATIENT
Start: 2019-01-21 | End: 2019-04-22 | Stop reason: SDUPTHER

## 2019-01-21 NOTE — PROGRESS NOTES
Chief Complaint Patient presents with  
 Other Polyneuropathy 1. Have you been to the ER, urgent care clinic since your last visit? Hospitalized since your last visit? no 
 
2. Have you seen or consulted any other health care providers outside of the [address] [place] [address] since your last visit? Include any pap smears or colon screening. Dr. Kaitlin Cotton'

## 2019-01-21 NOTE — PROGRESS NOTES
Neurology Progress Note NAME:  Erick Denise :   1950 MRN:   [id number] Date/Time:  2019 Subjective:  
Erick Denise is a 77 y.o. male here today for follow-up for neuropathy, back pain, radiculopathy, lumbar spondylosis, carpal tunnel syndrome. Patient says the pain has been minimal, at times activity brings it on but with medication it is manageable. Patient noted that he cannot walk  long distance without experiencing the back pain, I told patient to bring handicap form as that we will help reduce the strain on the back. He still has numbness and tingling sensation of the arms that goes down to the hands causing some weakness of the hands, sometimes has some difficulty holding on for times. He says at this time, the symptoms have not been so much that he can not manage, he wants to wait and see in terms of trying IVIG. He says minimal amount of pain medication helps with his pain. On a scale of 1-10, patient rates his pain to be between 7 and 8. Review of Systems - General ROS: positive for  - fatigue and sleep disturbance Psychological ROS: positive for - anxiety and sleep disturbances Ophthalmic ROS: positive for - decreased vision and uses glasses ENT ROS: negative Allergy and Immunology ROS: negative Hematological and Lymphatic ROS: negative Endocrine ROS: negative Respiratory ROS: no cough, shortness of breath, or wheezing Cardiovascular ROS: no chest pain or dyspnea on exertion Gastrointestinal ROS: no abdominal pain, change in bowel habits, or black or bloody stools Genito-Urinary ROS: no dysuria, trouble voiding, or hematuria Musculoskeletal ROS: positive for - gait disturbance, joint pain, joint stiffness, joint swelling, muscle pain and muscular weakness Neurological ROS: positive for - dizziness, gait disturbance, impaired coordination/balance, numbness/tingling, visual changes and weakness Dermatological ROS: negative Medications reviewed: Current Outpatient Medications Medication Sig Dispense Refill  oxyCODONE-acetaminophen (PERCOCET) 5-325 mg per tablet Take 1 Tab by mouth every eight (8) hours as needed for Pain. Max Daily Amount: 3 Tabs. 40 Tab 0  
 metFORMIN ER (GLUCOPHAGE XR) 500 mg tablet Take 1,000 mg by mouth two (2) times a day.  lisinopril (PRINIVIL, ZESTRIL) 20 mg tablet Take 20 mg by mouth daily.  triamterene-hydroCHLOROthiazide (MAXZIDE) 37.5-25 mg per tablet Take 1 Tab by mouth daily.  ASPIRIN PO Take 81 mg by mouth daily.  amLODIPine (NORVASC) 5 mg tablet Take 5 mg by mouth daily.  POTASSIUM CHLORIDE PO Take 20 mEq by mouth daily. ER micro  esomeprazole magnesium (NEXIUM PO) Take 22.5 mg by mouth daily.  Cholecalciferol, Vitamin D3, (VITAMIN D3) 2,000 unit cap capsule Take 2,000 Units by mouth daily.  cyanocobalamin (VITAMIN B-12) 1,000 mcg tablet Take 1,000 mcg by mouth daily.  labetalol (NORMODYNE) 300 mg tablet Take 900 mg by mouth two (2) times a day.  glipiZIDE (GLUCOTROL) 5 mg tablet Take 5 mg by mouth two (2) times a day.  tamsulosin (FLOMAX) 0.4 mg capsule Take 0.4 mg by mouth daily.  lovastatin (MEVACOR) 20 mg tablet Take 20 mg by mouth nightly.  OTHER Hand brace Bilateral cts 2 Units 0 Objective:  
Vitals: 
Vitals:  
 01/21/19 1030 BP: 133/82 Pulse: 81 Resp: 18 Temp: 98.2 °F (36.8 °C) TempSrc: Temporal  
SpO2: 98% Weight: 194 lb 3.2 oz (88.1 kg) Height: 5' 11\" (1.803 m) PainSc:   7 PainLoc: Hip Lab Data Reviewed: 
Lab Results Component Value Date/Time WBC 11.0 09/05/2016 09:41 AM  
 HCT 31.4 (L) 09/05/2016 09:41 AM  
 HGB 11.1 (L) 09/05/2016 09:41 AM  
 PLATELET 674 50/92/9710 09:41 AM  
 
 
Lab Results Component Value Date/Time  Sodium 137 09/05/2016 09:41 AM  
 Potassium 3.3 (L) 09/05/2016 09:41 AM  
 Chloride 101 09/05/2016 09:41 AM  
 CO2 30 09/05/2016 09:41 AM  
 Glucose 158 (H) 09/05/2016 09:41 AM  
 BUN 17 09/05/2016 09:41 AM  
 Creatinine 1.06 09/05/2016 09:41 AM  
 Calcium 9.1 09/05/2016 09:41 AM  
 
 
No components found for: Sheilda [employer] No results found for: LAUREN No results found for: HBA1C, HGBE8, ADT9CWYJ, XLC3WMLR, OSV8QFJS Lab Results Component Value Date/Time Vitamin B12 599 07/11/2018 09:15 AM  
 
 
No results found for: LAUREN, [place] No results found for: CHOL, CHOLPOCT, CHOLX, CHLST, CHOLV, HDL, HDLPOC, LDL, LDLCPOC, LDLC, DLDLP, VLDLC, VLDL, TGLX, TRIGL, TRIGP, TGLPOCT, CHHD, CHHDX 
 
 
CT Results (recent): 
Results from Hospital Encounter encounter on 09/05/16 CTA CHEST W WO CONT Narrative INDICATION:  chest pain r/o dissections and EXAM:  CTA Chest with contrast  
 
COMPARISON:  None TECHNIQUE:  Following the uneventful intravenous administration of Iodinated 
contrast, thin helical axial images were obtained through the chest. 3D image 
postprocessing was performed. Coronal and sagittal reformatted images were 
obtained. CT dose reduction was achieved through use of a standardized protocol 
tailored for this examination and automatic exposure control for dose 
modulation. FINDINGS: 
 
Thoracic aorta is normal in caliber without evidence for dissection or 
significant stenosis. Great vessel origins are patent. No evidence for acute pulmonary embolism. Mild to moderate emphysema. Bibasilar atelectasis. No other significant lung 
abnormality. The central airways are patent. Tiny pericardial effusion. No pleural effusion. No thoracic lymphadenopathy. Nonspecific thickening of the adrenal glands. A couple low-density renal lesions are too small to characterize. A tiny 
hyperenhancing focus in the liver are nonspecific (series 3, images 98). No acute osseous abnormality. Degenerative changes in the lower cervical spine 
with grade 1/2 anterolisthesis of C7 on T1.  
  
 Impression IMPRESSION: 
 
 No evidence for thoracic aortic aneurysm or dissection. No evidence for acute 
pulmonary embolism. Mild to moderate emphysema. Tiny pericardial effusion. Nonspecific thickening of the adrenal glands. Tiny hyperenhancing focus in the liver is nonspecific. Degenerative changes in the lower cervical spine with grade 1/2 anterolisthesis 
of C7 on T1. MRI Results (recent): 
Results from Hospital Encounter encounter on 07/21/18 MRI LUMB SPINE WO CONT Narrative INDICATION:  Chronic low back pain with right-sided radiculopathy, no trauma EXAMINATION:  MRI LUMBAR SPINE without CONTRAST 
 
COMPARISON: None TECHNIQUE: MR imaging of the lumbar spine was performed with sagittal T1, T2, 
STIR;  axial T1, T2.  NO CONTRAST ADMINISTERED FINDINGS: 
 
Mild retrolisthesis of L4 on L5 and L5 on S1. No other significant malalignment. Moderate to severe L4-5 and L5-S1 degenerative disc disease. No significant bone marrow signal abnormality. No evidence for fracture. The distal cord, conus medullaris, and cauda equina are unremarkable. T12-L1: No significant disc abnormality, central spinal canal stenosis, or 
neural foraminal stenosis. L1/2: Tiny disc bulge causing no central spinal canal or neural foraminal 
stenosis L2/3: Disc osteophyte complex, facet arthropathy, and ligamentum flavum 
hypertrophy causing moderate central stenosis. Left lateral recess stenosis with 
moderate left and mild right neural foraminal stenosis L3/4: Disc osteophyte complex, facet arthropathy, and ligamentum flavum 
hypertrophy causing moderate central stenosis. Bilateral recess stenosis with 
moderate right and moderate left neural foraminal stenosis. L4/5: Disc osteophyte complex, facet arthropathy, and ligamentum flavum 
hypertrophy causing moderate central stenosis. Bilateral recess stenosis with 
moderate to severe right and moderate to severe left neural foraminal stenosis. L5/S1: Disc osteophyte complex with right paracentral focal disc protrusion with 
mild to moderate central stenosis. Bilateral lateral recess stenosis with 
moderate to severe right and moderate left neural foraminal stenosis Impression IMPRESSION: 
 
Moderate central stenosis from L2-3 through L4-5. Multilevel neural foraminal 
narrowing as described above IR Results (recent): No results found for this or any previous visit. VAS/US Results (recent): No results found for this or any previous visit. PHYSICAL EXAM: 
General:    Alert, cooperative, no distress, appears stated age. Head:   Normocephalic, without obvious abnormality, atraumatic. Eyes:   Conjunctivae/corneas clear. PERRLA Nose:  Nares normal. No drainage or sinus tenderness. Throat:    Lips, mucosa, and tongue normal.  No Thrush Neck:  Supple, symmetrical,  no adenopathy, thyroid: non tender 
  no carotid bruit and no JVD. Paraspinal tenderness Back:    Symmetric, bilateral tenderness. Lungs:   Clear to auscultation bilaterally. No Wheezing or Rhonchi. No rales. Chest wall:  No tenderness or deformity. No Accessory muscle use. Heart:   Regular rate and rhythm,  no murmur, rub or gallop. Abdomen:   Soft, non-tender. Not distended. Bowel sounds normal. No masses Extremities: Extremities normal, atraumatic, No cyanosis. No edema. No clubbing Skin:     Texture, turgor normal. No rashes or lesions. Not Jaundiced Lymph nodes: Cervical, supraclavicular normal. 
Psych:  Good insight. Not depressed. Not anxious or agitated. NEUROLOGICAL EXAM: 
Appearance: The patient is well developed, well nourished, provides a coherent history and is in no acute distress. Mental Status: Oriented to time, place and person. Mood and affect appropriate. Cranial Nerves:   Intact visual fields. Fundi are benign. HECTOR, EOM's full, no nystagmus, no ptosis.  Facial sensation is normal. Corneal reflexes are intact. Facial movement is symmetric. Hearing is normal bilaterally. Palate is midline with normal sternocleidomastoid and trapezius muscles are normal. Tongue is midline. Motor:  5/5 strength in upper and lower proximal and distal muscles. Normal bulk and tone. No fasciculations. Reflexes:   Deep tendon reflexes 2+/4 and symmetrical.  
Sensory:    Decreased sensation to touch, pinprick and vibration up to the elbow and upper extremity and up to the knee and lower extremity. Gait:  Normal gait. Tremor:   No tremor noted. Cerebellar:  No cerebellar signs present. Neurovascular:  Normal heart sounds and regular rhythm, peripheral pulses intact, and no carotid bruits. Assesment 1. Chronic pain syndrome Continue management 2. Polyneuropathy Continue management 3. Bilateral carpal tunnel syndrome Continue conservative management 4. 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 
 
___________________________________________________ PLAN: Medication and plan discussed with patient ICD-10-CM ICD-9-CM 1. Polyneuropathy G62.9 356.9 2. Chronic pain syndrome G89.4 338.4 oxyCODONE-acetaminophen (PERCOCET) 5-325 mg per tablet 3. Bilateral carpal tunnel syndrome G56.03 354.0 4. 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 CIDP (chronic inflammatory demyelinating polyneuropathy) ([facility]) G61.81 357.81 Follow-up Disposition: 
Return in about 3 months (around 4/21/2019). 
 
 
  
___________________________________________________ Total time spent with patient:  []15   []25   []35   [] __ minutes Care Plan discussed with: 
  []Patient   []Family    []Care Manager   []Consultant/Specialist : 
 
___________________________________________________ Attending Physician: Army Rox MD

## 2019-04-22 ENCOUNTER — OFFICE VISIT (OUTPATIENT)
Dept: NEUROLOGY | Age: 69
End: 2019-04-22

## 2019-04-22 VITALS
RESPIRATION RATE: 16 BRPM | TEMPERATURE: 98.3 F | BODY MASS INDEX: 26.54 KG/M2 | HEART RATE: 85 BPM | OXYGEN SATURATION: 98 % | HEIGHT: 71 IN | WEIGHT: 189.6 LBS | DIASTOLIC BLOOD PRESSURE: 77 MMHG | SYSTOLIC BLOOD PRESSURE: 149 MMHG

## 2019-04-22 DIAGNOSIS — M54.50 CHRONIC BILATERAL LOW BACK PAIN WITHOUT SCIATICA: ICD-10-CM

## 2019-04-22 DIAGNOSIS — G62.9 POLYNEUROPATHY: Primary | ICD-10-CM

## 2019-04-22 DIAGNOSIS — M43.06 LUMBAR SPONDYLOLYSIS: ICD-10-CM

## 2019-04-22 DIAGNOSIS — G56.03 BILATERAL CARPAL TUNNEL SYNDROME: ICD-10-CM

## 2019-04-22 DIAGNOSIS — G89.4 CHRONIC PAIN SYNDROME: ICD-10-CM

## 2019-04-22 DIAGNOSIS — G89.29 CHRONIC BILATERAL LOW BACK PAIN WITHOUT SCIATICA: ICD-10-CM

## 2019-04-22 RX ORDER — OXYCODONE AND ACETAMINOPHEN 5; 325 MG/1; MG/1
1 TABLET ORAL
Qty: 40 TAB | Refills: 0 | Status: SHIPPED | OUTPATIENT
Start: 2019-04-22 | End: 2019-05-22

## 2019-04-22 RX ORDER — PREDNISONE 5 MG/1
5 TABLET ORAL 2 TIMES DAILY
Qty: 20 TAB | Refills: 0 | Status: SHIPPED | OUTPATIENT
Start: 2019-04-22

## 2019-04-22 NOTE — PROGRESS NOTES
Chief Complaint Patient presents with  Neurologic Problem Polyneuropathy 1. Have you been to the ER, urgent care clinic since your last visit? Hospitalized since your last visit? no 
 
2. Have you seen or consulted any other health care providers outside of the [address] [place] [address] since your last visit? Include any pap smears or colon screening.  no

## 2019-04-22 NOTE — PROGRESS NOTES
Neurology Progress Note NAME:  Renato Gonzalez :   1950 MRN:   [id number] Date/Time:  2019 Subjective:  
Renato Gonzalez is a 68 y.o. male here today for follow-up for neuropathy, back pain, radiculopathy, lumbar spondylosis, carpal tunnel syndrome. Patient says the back pain was aggravated after the fall, pain is intermittent, sometimes persistent, burning sensation that goes down to the leg causing numbness and tingling sensation. .  Patient noted that he cannot walk  long distance without experiencing the back pain but medication helps a lot. He experiences cramp in the right leg mostly when the pain is in the back, when he rests the pain gets better and the cramping is somewhat relieved He still has numbness and tingling sensation of the arms that goes down to the hands causing some weakness of the hands, sometimes has some difficulty holding on for times. He uses hand brace at times when the numbness and tingling sensation gets worse. He says minimal amount of pain medication helps with his pain. On a scale of 1-10, patient rates his pain to be between 6 and 7. Because of the chronicity of the pain, I will refer patient to pain management specialist for long-term pain. Review of Systems - General ROS: positive for  - fatigue and sleep disturbance Psychological ROS: positive for - anxiety and sleep disturbances Ophthalmic ROS: positive for - decreased vision and uses glasses ENT ROS: negative Allergy and Immunology ROS: negative Hematological and Lymphatic ROS: negative Endocrine ROS: negative Respiratory ROS: no cough, shortness of breath, or wheezing Cardiovascular ROS: no chest pain or dyspnea on exertion Gastrointestinal ROS: no abdominal pain, change in bowel habits, or black or bloody stools Genito-Urinary ROS: no dysuria, trouble voiding, or hematuria Musculoskeletal ROS: positive for - gait disturbance, joint pain, joint stiffness, joint swelling, muscle pain and muscular weakness Neurological ROS: positive for - dizziness, gait disturbance, impaired coordination/balance, numbness/tingling, visual changes and weakness Dermatological ROS: negative Medications reviewed: 
Current Outpatient Medications Medication Sig Dispense Refill  oxyCODONE-acetaminophen (PERCOCET) 5-325 mg per tablet Take 1 Tab by mouth every eight (8) hours as needed for Pain. Max Daily Amount: 3 Tabs. 40 Tab 0  
 metFORMIN ER (GLUCOPHAGE XR) 500 mg tablet Take 1,000 mg by mouth two (2) times a day.  lisinopril (PRINIVIL, ZESTRIL) 20 mg tablet Take 20 mg by mouth daily.  triamterene-hydroCHLOROthiazide (MAXZIDE) 37.5-25 mg per tablet Take 1 Tab by mouth daily.  ASPIRIN PO Take 81 mg by mouth daily.  amLODIPine (NORVASC) 5 mg tablet Take 5 mg by mouth daily.  POTASSIUM CHLORIDE PO Take 20 mEq by mouth daily. ER micro  esomeprazole magnesium (NEXIUM PO) Take 22.5 mg by mouth daily.  Cholecalciferol, Vitamin D3, (VITAMIN D3) 2,000 unit cap capsule Take 2,000 Units by mouth daily.  cyanocobalamin (VITAMIN B-12) 1,000 mcg tablet Take 1,000 mcg by mouth daily.  labetalol (NORMODYNE) 300 mg tablet Take 900 mg by mouth two (2) times a day.  glipiZIDE (GLUCOTROL) 5 mg tablet Take 5 mg by mouth two (2) times a day.  tamsulosin (FLOMAX) 0.4 mg capsule Take 0.4 mg by mouth daily.  lovastatin (MEVACOR) 20 mg tablet Take 20 mg by mouth nightly.  OTHER Hand brace Bilateral cts 2 Units 0 Objective:  
Vitals: 
Vitals:  
 04/22/19 1045 BP: 149/77 Pulse: 85 Resp: 16 Temp: 98.3 °F (36.8 °C) TempSrc: Temporal  
SpO2: 98% Weight: 189 lb 9.6 oz (86 kg) Height: 5' 11\" (1.803 m) PainSc:   6 PainLoc: Generalized Lab Data Reviewed: 
Lab Results Component Value Date/Time  WBC 11.0 09/05/2016 09:41 AM  
 HCT 31.4 (L) 09/05/2016 09:41 AM  
 HGB 11.1 (L) 09/05/2016 09:41 AM  
 PLATELET 915 72/99/1871 09:41 AM  
 
 
Lab Results Component Value Date/Time Sodium 137 09/05/2016 09:41 AM  
 Potassium 3.3 (L) 09/05/2016 09:41 AM  
 Chloride 101 09/05/2016 09:41 AM  
 CO2 30 09/05/2016 09:41 AM  
 Glucose 158 (H) 09/05/2016 09:41 AM  
 BUN 17 09/05/2016 09:41 AM  
 Creatinine 1.06 09/05/2016 09:41 AM  
 Calcium 9.1 09/05/2016 09:41 AM  
 
 
No components found for: Ros Varelar No results found for: LAUREN No results found for: HBA1C, HGBE8, [id number], [id number] Lab Results Component Value Date/Time Vitamin B12 599 07/11/2018 09:15 AM  
 
 
No results found for: LAUREN, Lindsay Knowles No results found for: CHOL, CHOLPOCT, CHOLX, CHLST, CHOLV, HDL, HDLPOC, LDL, LDLCPOC, LDLC, DLDLP, VLDLC, VLDL, TGLX, TRIGL, TRIGP, TGLPOCT, CHHD, CHHDX 
 
 
CT Results (recent): 
Results from Hospital Encounter encounter on 09/05/16 CTA CHEST W WO CONT Narrative INDICATION:  chest pain r/o dissections and EXAM:  CTA Chest with contrast  
 
COMPARISON:  None TECHNIQUE:  Following the uneventful intravenous administration of Iodinated 
contrast, thin helical axial images were obtained through the chest. 3D image 
postprocessing was performed. Coronal and sagittal reformatted images were 
obtained. CT dose reduction was achieved through use of a standardized protocol 
tailored for this examination and automatic exposure control for dose 
modulation. FINDINGS: 
 
Thoracic aorta is normal in caliber without evidence for dissection or 
significant stenosis. Great vessel origins are patent. No evidence for acute pulmonary embolism. Mild to moderate emphysema. Bibasilar atelectasis. No other significant lung 
abnormality. The central airways are patent. Tiny pericardial effusion. No pleural effusion. No thoracic lymphadenopathy. Nonspecific thickening of the adrenal glands. A couple low-density renal lesions are too small to characterize. A tiny hyperenhancing focus in the liver are nonspecific (series 3, images 98). No acute osseous abnormality. Degenerative changes in the lower cervical spine 
with grade 1/2 anterolisthesis of C7 on T1. Impression IMPRESSION: 
 
No evidence for thoracic aortic aneurysm or dissection. No evidence for acute 
pulmonary embolism. Mild to moderate emphysema. Tiny pericardial effusion. Nonspecific thickening of the adrenal glands. Tiny hyperenhancing focus in the liver is nonspecific. Degenerative changes in the lower cervical spine with grade 1/2 anterolisthesis 
of C7 on T1. MRI Results (recent): 
Results from Hospital Encounter encounter on 07/21/18 MRI LUMB SPINE WO CONT Narrative INDICATION:  Chronic low back pain with right-sided radiculopathy, no trauma EXAMINATION:  MRI LUMBAR SPINE without CONTRAST 
 
COMPARISON: None TECHNIQUE: MR imaging of the lumbar spine was performed with sagittal T1, T2, 
STIR;  axial T1, T2.  NO CONTRAST ADMINISTERED FINDINGS: 
 
Mild retrolisthesis of L4 on L5 and L5 on S1. No other significant malalignment. Moderate to severe L4-5 and L5-S1 degenerative disc disease. No significant bone marrow signal abnormality. No evidence for fracture. The distal cord, conus medullaris, and cauda equina are unremarkable. T12-L1: No significant disc abnormality, central spinal canal stenosis, or 
neural foraminal stenosis. L1/2: Tiny disc bulge causing no central spinal canal or neural foraminal 
stenosis L2/3: Disc osteophyte complex, facet arthropathy, and ligamentum flavum 
hypertrophy causing moderate central stenosis. Left lateral recess stenosis with 
moderate left and mild right neural foraminal stenosis L3/4: Disc osteophyte complex, facet arthropathy, and ligamentum flavum 
hypertrophy causing moderate central stenosis. Bilateral recess stenosis with 
moderate right and moderate left neural foraminal stenosis. L4/5: Disc osteophyte complex, facet arthropathy, and ligamentum flavum 
hypertrophy causing moderate central stenosis. Bilateral recess stenosis with 
moderate to severe right and moderate to severe left neural foraminal stenosis. L5/S1: Disc osteophyte complex with right paracentral focal disc protrusion with 
mild to moderate central stenosis. Bilateral lateral recess stenosis with 
moderate to severe right and moderate left neural foraminal stenosis Impression IMPRESSION: 
 
Moderate central stenosis from L2-3 through L4-5. Multilevel neural foraminal 
narrowing as described above IR Results (recent): No results found for this or any previous visit. VAS/US Results (recent): No results found for this or any previous visit. PHYSICAL EXAM: 
General:    Alert, cooperative, no distress, appears stated age. Head:   Normocephalic, without obvious abnormality, atraumatic. Eyes:   Conjunctivae/corneas clear. PERRLA Nose:  Nares normal. No drainage or sinus tenderness. Throat:    Lips, mucosa, and tongue normal.  No Thrush Neck:  Supple, symmetrical,  no adenopathy, thyroid: non tender 
  no carotid bruit and no JVD. Back:    Symmetric, bilateral tenderness. Lungs:   Clear to auscultation bilaterally. No Wheezing or Rhonchi. No rales. Chest wall:  No tenderness or deformity. No Accessory muscle use. Heart:   Regular rate and rhythm,  no murmur, rub or gallop. Abdomen:   Soft, non-tender. Not distended. Bowel sounds normal. No masses Extremities: Extremities normal, atraumatic, No cyanosis. No edema. No clubbing Skin:     Texture, turgor normal. No rashes or lesions. Not Jaundiced Lymph nodes: Cervical, supraclavicular normal. 
Psych:  Good insight. Not depressed. Not anxious or agitated. NEUROLOGICAL EXAM: 
Appearance: The patient is well developed, well nourished, provides a coherent history and is in no acute distress. Mental Status: Oriented to time, place and person. Mood and affect appropriate. Cranial Nerves:   Intact visual fields. Fundi are benign. HECTOR, EOM's full, no nystagmus, no ptosis. Facial sensation is normal. Corneal reflexes are intact. Facial movement is symmetric. Hearing is normal bilaterally. Palate is midline with normal sternocleidomastoid and trapezius muscles are normal. Tongue is midline. Motor:  5/5 strength in upper and lower proximal and distal muscles. Normal bulk and tone. No fasciculations. Reflexes:   Deep tendon reflexes 2+/4 and symmetrical.  
Sensory:    Decreased sensation to touch, pinprick and vibration in stocking and glove distribution. Gait:  Normal gait. Tremor:   No tremor noted. Cerebellar:  No cerebellar signs present. Neurovascular:  Normal heart sounds and regular rhythm, peripheral pulses intact, and no carotid bruits. Assesment 1. Polyneuropathy Continue management 2. Bilateral carpal tunnel syndrome We will continue with conservative management for now, patient is not ready for surgical release 3. Lumbar spondylolysis Intermittent physical therapy 4. Chronic pain syndrome Referred to pain management 5. Chronic bilateral low back pain without sciatica Referred to pain management 
 
___________________________________________________ PLAN: Medication and plan discussed with patient ICD-10-CM ICD-9-CM 1. Polyneuropathy G62.9 356.9 2. Bilateral carpal tunnel syndrome G56.03 354.0 3. Lumbar spondylolysis M43.06 738.4 4. Chronic pain syndrome G89.4 338.4 5. Chronic bilateral low back pain without sciatica M54.5 724.2 G89.29 338.29 Follow-up and Dispositions · Return in about 3 months (around 7/22/2019). 
  
 
 
 
  
___________________________________________________ Total time spent with patient:  []15   []25   []35   [] __ minutes Care Plan discussed with: 
 []Patient   []Family    []Care Manager   []Consultant/Specialist : 
 
___________________________________________________ Attending Physician: Carmen Apple MD

## 2019-07-02 ENCOUNTER — TELEPHONE (OUTPATIENT)
Dept: NEUROLOGY | Age: 69
End: 2019-07-02

## 2019-07-02 DIAGNOSIS — G62.9 POLYNEUROPATHY: ICD-10-CM

## 2019-07-02 DIAGNOSIS — M54.50 CHRONIC BILATERAL LOW BACK PAIN WITHOUT SCIATICA: ICD-10-CM

## 2019-07-02 DIAGNOSIS — G89.29 CHRONIC BILATERAL LOW BACK PAIN WITHOUT SCIATICA: ICD-10-CM

## 2019-07-02 DIAGNOSIS — M54.16 LUMBAR RADICULOPATHY: Primary | ICD-10-CM

## 2019-07-02 NOTE — TELEPHONE ENCOUNTER
Patient states something is going on with back, having some pain. Patient states started on Thurs or Fri last week. Patient has Medicare so he wants to know if you can just schedule one for him.     Needs ASAP

## 2019-07-10 ENCOUNTER — HOSPITAL ENCOUNTER (OUTPATIENT)
Dept: MRI IMAGING | Age: 69
Discharge: HOME | End: 2019-07-10
Attending: PSYCHIATRY & NEUROLOGY
Payer: MEDICARE

## 2019-07-10 DIAGNOSIS — M54.16 LUMBAR RADICULOPATHY: ICD-10-CM

## 2019-07-10 DIAGNOSIS — M54.50 CHRONIC BILATERAL LOW BACK PAIN WITHOUT SCIATICA: ICD-10-CM

## 2019-07-10 DIAGNOSIS — G62.9 POLYNEUROPATHY: ICD-10-CM

## 2019-07-10 DIAGNOSIS — G89.29 CHRONIC BILATERAL LOW BACK PAIN WITHOUT SCIATICA: ICD-10-CM

## 2019-07-10 PROCEDURE — 72148 MRI LUMBAR SPINE W/O DYE: CPT

## 2019-07-11 ENCOUNTER — TELEPHONE (OUTPATIENT)
Dept: NEUROLOGY | Age: 69
End: 2019-07-11

## 2019-07-31 ENCOUNTER — OFFICE VISIT (OUTPATIENT)
Dept: NEUROLOGY | Age: 69
End: 2019-07-31

## 2019-07-31 VITALS
DIASTOLIC BLOOD PRESSURE: 69 MMHG | HEART RATE: 73 BPM | RESPIRATION RATE: 16 BRPM | WEIGHT: 190.8 LBS | SYSTOLIC BLOOD PRESSURE: 105 MMHG | OXYGEN SATURATION: 97 % | BODY MASS INDEX: 26.71 KG/M2 | TEMPERATURE: 98.3 F | HEIGHT: 71 IN

## 2019-07-31 DIAGNOSIS — G89.29 CHRONIC BILATERAL LOW BACK PAIN WITHOUT SCIATICA: ICD-10-CM

## 2019-07-31 DIAGNOSIS — R20.2 PARESTHESIA: ICD-10-CM

## 2019-07-31 DIAGNOSIS — M54.16 LUMBAR RADICULOPATHY: Primary | ICD-10-CM

## 2019-07-31 DIAGNOSIS — M54.50 CHRONIC BILATERAL LOW BACK PAIN WITHOUT SCIATICA: ICD-10-CM

## 2019-07-31 DIAGNOSIS — G89.4 CHRONIC PAIN SYNDROME: ICD-10-CM

## 2019-07-31 DIAGNOSIS — G56.03 BILATERAL CARPAL TUNNEL SYNDROME: ICD-10-CM

## 2019-07-31 DIAGNOSIS — G62.9 POLYNEUROPATHY: ICD-10-CM

## 2019-07-31 RX ORDER — DEXAMETHASONE 4 MG/1
4 TABLET ORAL 2 TIMES DAILY WITH MEALS
Qty: 20 TAB | Refills: 1 | Status: SHIPPED | OUTPATIENT
Start: 2019-07-31 | End: 2020-02-07 | Stop reason: SDUPTHER

## 2019-07-31 RX ORDER — OXYCODONE AND ACETAMINOPHEN 10; 325 MG/1; MG/1
1 TABLET ORAL
Qty: 40 TAB | Refills: 0 | Status: SHIPPED | OUTPATIENT
Start: 2019-07-31 | End: 2019-08-03

## 2019-07-31 NOTE — PROGRESS NOTES
Chief Complaint   Patient presents with    Neurologic Problem    Results     MRI     1. Have you been to the ER, urgent care clinic since your last visit? Hospitalized since your last visit? No    2. Have you seen or consulted any other health care providers outside of the [address] [place] [address] since your last visit? Include any pap smears or colon screening.  Dr. William Lagos

## 2019-08-24 NOTE — PROGRESS NOTES
Neurology Progress Note    NAME:  Quyen Enriquez   :   1950   MRN:   [id number]     Date/Time:  2019  Subjective:   Tracee Camargo is a 68 y.o. male here today for follow-up for neuropathy, back pain, radiculopathy, lumbar spondylosis, carpal tunnel syndrome, test results. Patient says the back pain was aggravated after the fall, pain is intermittent, sometimes persistent, burning sensation that goes down to the leg causing numbness and tingling sensation. .  Patient noted that he cannot walk  long distance without experiencing the back pain but medication helps a lot. He experiences cramp in the right leg mostly when the pain is in the back, when he rests the pain gets better and the cramping is somewhat relieved  He continues to have numbness and tingling sensation of the arms that goes down to the hands causing some weakness of the hands, sometimes has some difficulty holding on for times. MRI of the lumbosacral spine reviewed with patient shows new right disc extrusion 1 cm impinging in the L5 and S1 nerve root centrally, does stenoses of L4-5, L5-S1. Patient does not want to see a surgeon at this time, I will refer patient to physical therapy and monitor patient's progress  He uses hand brace at times when the numbness and tingling sensation gets worse. He says minimal amount of pain medication helps with his pain. On a scale of 1-10, patient rates his pain to be between 7 and 8. Because of the chronicity of the pain, I will refer patient to pain management specialist for long-term pain.   Review of Systems - General ROS: positive for  - fatigue and sleep disturbance  Psychological ROS: positive for - anxiety and sleep disturbances  Ophthalmic ROS: positive for - decreased vision and uses glasses  ENT ROS: negative  Allergy and Immunology ROS: negative  Hematological and Lymphatic ROS: negative  Endocrine ROS: negative  Respiratory ROS: no cough, shortness of breath, or wheezing  Cardiovascular ROS: no chest pain or dyspnea on exertion  Gastrointestinal ROS: no abdominal pain, change in bowel habits, or black or bloody stools  Genito-Urinary ROS: no dysuria, trouble voiding, or hematuria  Musculoskeletal ROS: positive for - gait disturbance, joint pain, joint stiffness, joint swelling, muscle pain and muscular weakness  Neurological ROS: positive for - dizziness, gait disturbance, impaired coordination/balance, numbness/tingling, visual changes and weakness  Dermatological ROS: negative          Medications reviewed:  Current Outpatient Medications   Medication Sig Dispense Refill    dexAMETHasone (DECADRON) 4 mg tablet Take 4 mg by mouth two (2) times daily (with meals). 20 Tab 1    metFORMIN ER (GLUCOPHAGE XR) 500 mg tablet Take 1,000 mg by mouth two (2) times a day.  lisinopril (PRINIVIL, ZESTRIL) 20 mg tablet Take 20 mg by mouth daily.  triamterene-hydroCHLOROthiazide (MAXZIDE) 37.5-25 mg per tablet Take 1 Tab by mouth daily.  ASPIRIN PO Take 81 mg by mouth daily.  amLODIPine (NORVASC) 5 mg tablet Take 5 mg by mouth daily.  POTASSIUM CHLORIDE PO Take 20 mEq by mouth daily. ER micro      esomeprazole magnesium (NEXIUM PO) Take 22.5 mg by mouth daily.  Cholecalciferol, Vitamin D3, (VITAMIN D3) 2,000 unit cap capsule Take 2,000 Units by mouth daily.  cyanocobalamin (VITAMIN B-12) 1,000 mcg tablet Take 1,000 mcg by mouth daily.  labetalol (NORMODYNE) 300 mg tablet Take 900 mg by mouth two (2) times a day.  glipiZIDE (GLUCOTROL) 5 mg tablet Take 5 mg by mouth two (2) times a day.  tamsulosin (FLOMAX) 0.4 mg capsule Take 0.4 mg by mouth daily.  lovastatin (MEVACOR) 20 mg tablet Take 20 mg by mouth nightly.  predniSONE (DELTASONE) 5 mg tablet Take 1 Tab by mouth two (2) times a day.  20 Tab 0    OTHER Hand brace  Bilateral cts 2 Units 0        Objective:   Vitals:  Vitals:    07/31/19 1309   BP: 105/69   Pulse: 73   Resp: 16   Temp: 98.3 °F (36.8 °C)   TempSrc: Temporal   SpO2: 97%   Weight: 190 lb 12.8 oz (86.5 kg)   Height: 5' 11\" (1.803 m)   PainSc:   7   PainLoc: Generalized         Lab Data Reviewed:  Lab Results   Component Value Date/Time    WBC 11.0 09/05/2016 09:41 AM    HCT 31.4 (L) 09/05/2016 09:41 AM    HGB 11.1 (L) 09/05/2016 09:41 AM    PLATELET 804 74/44/3181 09:41 AM       Lab Results   Component Value Date/Time    Sodium 137 09/05/2016 09:41 AM    Potassium 3.3 (L) 09/05/2016 09:41 AM    Chloride 101 09/05/2016 09:41 AM    CO2 30 09/05/2016 09:41 AM    Glucose 158 (H) 09/05/2016 09:41 AM    BUN 17 09/05/2016 09:41 AM    Creatinine 1.06 09/05/2016 09:41 AM    Calcium 9.1 09/05/2016 09:41 AM       No components found for: TROPQUANT    No results found for: LAUREN      No results found for: HBA1C, HGBE8, FXR6JRDM, BVI5HGAU     Lab Results   Component Value Date/Time    Vitamin B12 599 07/11/2018 09:15 AM       No results found for: LAUREN, ANARX, ANAIGG, XBANA    No results found for: CHOL, CHOLPOCT, CHOLX, CHLST, CHOLV, HDL, HDLPOC, LDL, LDLCPOC, LDLC, DLDLP, VLDLC, VLDL, TGLX, TRIGL, TRIGP, TGLPOCT, CHHD, CHHDX      CT Results (recent):  Results from [place] encounter on 09/05/16   CTA CHEST W WO CONT    Narrative INDICATION:  chest pain r/o dissections and     EXAM:  CTA Chest with contrast     COMPARISON:  None    TECHNIQUE:  Following the uneventful intravenous administration of Iodinated  contrast, thin helical axial images were obtained through the chest. 3D image  postprocessing was performed. Coronal and sagittal reformatted images were  obtained. CT dose reduction was achieved through use of a standardized protocol  tailored for this examination and automatic exposure control for dose  modulation. FINDINGS:    Thoracic aorta is normal in caliber without evidence for dissection or  significant stenosis. Great vessel origins are patent. No evidence for acute pulmonary embolism.     Mild to moderate emphysema. Bibasilar atelectasis. No other significant lung  abnormality. The central airways are patent. Tiny pericardial effusion. No pleural effusion. No thoracic lymphadenopathy. Nonspecific thickening of the adrenal glands. A couple low-density renal lesions are too small to characterize. A tiny  hyperenhancing focus in the liver are nonspecific (series 3, images 98). No acute osseous abnormality. Degenerative changes in the lower cervical spine  with grade 1/2 anterolisthesis of C7 on T1. Impression IMPRESSION:    No evidence for thoracic aortic aneurysm or dissection. No evidence for acute  pulmonary embolism. Mild to moderate emphysema. Tiny pericardial effusion. Nonspecific thickening of the adrenal glands. Tiny hyperenhancing focus in the liver is nonspecific. Degenerative changes in the lower cervical spine with grade 1/2 anterolisthesis  of C7 on T1. MRI Results (recent):  Results from [place] encounter on 07/10/19   MRI LUMB SPINE WO CONT    Narrative EXAM: MRI LUMB SPINE WO CONT    INDICATION: Radiculopathy. Chronic bilateral lower back pain    COMPARISON: 7.21.2018    TECHNIQUE: MR imaging of the lumbar spine was performed using the following  sequences: sagittal T1, T2, STIR;  axial T1, T2.     CONTRAST:  None. FINDINGS:    There is normal alignment of the lumbar spine. Vertebral body heights are  maintained. Marrow signal is stable. The conus medullaris terminates at T12. Signal and caliber of the distal spinal  cord are within normal limits. The paraspinal soft tissues are within normal limits. Lower thoracic spine: No herniation or stenosis. L1-L2: No herniation or stenosis. Mild disc desiccation and disc bulge. L2-L3: There is a mild disc bulge. Bilateral facet arthropathy and ligamentum  flavum hypertrophy is noted. Central canal measures 11 mm anterior to posterior  (series 601, image 17).  Mild bilateral neural foraminal stenosis. L3-L4: Broad disc bulge, bilateral ligamentum flavum hypertrophy and bilateral  facet distraction. Associated ligamentum flavum hypertrophy. Central canal  measures 10.6 mm anterior to posterior. Moderate bilateral neural foraminal  stenosis (series 601, image 12). L4-L5: Broad disc bulge. Bilateral ligamentum flavum hypertrophy and bilateral  facet distraction. Central canal measures 9 mm anterior to posterior. Moderate  severe bilateral neural foraminal stenosis (series 601, image 7). L5-S1: Broad disc bulge. Right canal extrusion versus free fragment that  measures 8 x 7.6 x 10 mm in size (series 601, image 2). Severe right and  moderate severe left neural foraminal stenosis. Impression IMPRESSION:    New right-sided 1 cm extrusion versus free fragment at the L5-S1 level,  impinging the nerve root centrally. Moderate bilateral L3-4, moderate severe bilateral L4-5 and left L5-S1 and  severe right L5-S1 neural foraminal stenosis. 23X       IR Results (recent):  No results found for this or any previous visit. VAS/US Results (recent):  No results found for this or any previous visit. PHYSICAL EXAM:  General:    Alert, cooperative, no distress, appears stated age. Head:   Normocephalic, without obvious abnormality, atraumatic. Eyes:   Conjunctivae/corneas clear. PERRLA  Nose:  Nares normal. No drainage or sinus tenderness. Throat:    Lips, mucosa, and tongue normal.  No Thrush  Neck:  Supple, symmetrical,  no adenopathy, thyroid: non tender    no carotid bruit and no JVD. Back:    Symmetric, bilateral tenderness. Lungs:   Clear to auscultation bilaterally. No Wheezing or Rhonchi. No rales. Chest wall:  No tenderness or deformity. No Accessory muscle use. Heart:   Regular rate and rhythm,  no murmur, rub or gallop. Abdomen:   Soft, non-tender. Not distended. Bowel sounds normal. No masses  Extremities: Extremities normal, atraumatic, No cyanosis. No edema.  No clubbing  Skin:     Texture, turgor normal. No rashes or lesions. Not Jaundiced  Lymph nodes: Cervical, supraclavicular normal.  Psych:  Good insight. Not depressed. Not anxious or agitated. NEUROLOGICAL EXAM:  Appearance: The patient is well developed, well nourished, provides a coherent history and is in no acute distress. Mental Status: Oriented to time, place and person. Mood and affect appropriate. Cranial Nerves:   Intact visual fields. Fundi are benign. HECTOR, EOM's full, no nystagmus, no ptosis. Facial sensation is normal. Corneal reflexes are intact. Facial movement is symmetric. Hearing is normal bilaterally. Palate is midline with normal sternocleidomastoid and trapezius muscles are normal. Tongue is midline. Motor:  5/5 strength in upper and lower proximal and distal muscles. Normal bulk and tone. No fasciculations. Reflexes:   Deep tendon reflexes 2+/4 and symmetrical.   Sensory:    Dysesthesia to touch, pinprick and vibration. Gait:  Normal gait. Tremor:   No tremor noted. Cerebellar:  No cerebellar signs present. Neurovascular:  Normal heart sounds and regular rhythm, peripheral pulses intact, and no carotid bruits. Assesment  1. Lumbar radiculopathy  Physical therapy    2. Polyneuropathy  Continue management    3. Paresthesia  Stable    4. Chronic pain syndrome    - oxyCODONE-acetaminophen (PERCOCET 10)  mg per tablet; Take 1 Tab by mouth every eight (8) hours as needed for Pain for up to 3 days. Max Daily Amount: 3 Tabs. Dispense: 40 Tab; Refill: 0    5. Chronic bilateral low back pain without sciatica  Physical therapy  - oxyCODONE-acetaminophen (PERCOCET 10)  mg per tablet; Take 1 Tab by mouth every eight (8) hours as needed for Pain for up to 3 days. Max Daily Amount: 3 Tabs. Dispense: 40 Tab; Refill: 0    6.  Bilateral carpal tunnel syndrome  Stable    ___________________________________________________  PLAN: Medication and plan discussed with patient      ICD-10-CM ICD-9-CM    1. Lumbar radiculopathy M54.16 724.4    2. Polyneuropathy G62.9 356.9    3. Paresthesia R20.2 782.0    4. Chronic pain syndrome G89.4 338.4 oxyCODONE-acetaminophen (PERCOCET 10)  mg per tablet   5. Chronic bilateral low back pain without sciatica M54.5 724.2 oxyCODONE-acetaminophen (PERCOCET 10)  mg per tablet    G89.29 338.29    6.  Bilateral carpal tunnel syndrome G56.03 354.0      Follow-up and Dispositions    · Return in about 3 months (around 10/31/2019).               ___________________________________________________    Attending Physician: Dayton Carrera MD

## 2019-10-31 ENCOUNTER — OFFICE VISIT (OUTPATIENT)
Dept: NEUROLOGY | Age: 69
End: 2019-10-31

## 2019-10-31 VITALS
OXYGEN SATURATION: 90 % | WEIGHT: 191.6 LBS | DIASTOLIC BLOOD PRESSURE: 65 MMHG | BODY MASS INDEX: 26.82 KG/M2 | HEIGHT: 71 IN | TEMPERATURE: 98.4 F | SYSTOLIC BLOOD PRESSURE: 102 MMHG | HEART RATE: 91 BPM | RESPIRATION RATE: 18 BRPM

## 2019-10-31 DIAGNOSIS — G89.4 CHRONIC PAIN SYNDROME: ICD-10-CM

## 2019-10-31 DIAGNOSIS — G89.29 CHRONIC BILATERAL LOW BACK PAIN WITHOUT SCIATICA: ICD-10-CM

## 2019-10-31 DIAGNOSIS — Z79.891 USE OF OPIATES FOR THERAPEUTIC PURPOSES: ICD-10-CM

## 2019-10-31 DIAGNOSIS — M54.50 CHRONIC BILATERAL LOW BACK PAIN WITHOUT SCIATICA: ICD-10-CM

## 2019-10-31 DIAGNOSIS — R20.2 PARESTHESIA: ICD-10-CM

## 2019-10-31 DIAGNOSIS — G62.9 POLYNEUROPATHY: ICD-10-CM

## 2019-10-31 DIAGNOSIS — M54.16 LUMBAR RADICULOPATHY: Primary | ICD-10-CM

## 2019-10-31 DIAGNOSIS — G56.03 BILATERAL CARPAL TUNNEL SYNDROME: ICD-10-CM

## 2019-10-31 RX ORDER — OXYCODONE AND ACETAMINOPHEN 10; 325 MG/1; MG/1
TABLET ORAL
COMMUNITY
End: 2019-10-31 | Stop reason: SDUPTHER

## 2019-10-31 RX ORDER — OXYCODONE AND ACETAMINOPHEN 10; 325 MG/1; MG/1
1 TABLET ORAL
Qty: 60 TAB | Refills: 0 | Status: SHIPPED | OUTPATIENT
Start: 2019-10-31 | End: 2019-11-30

## 2019-10-31 NOTE — PROGRESS NOTES
Neurology Progress Note    NAME:  Carissa Wallace   :   1950   MRN:   [id number]     Date/Time:  10/31/2019  Subjective:     Aretha Whaley is a 68 y.o. male here today for follow-up for neuropathy, back pain, radiculopathy, lumbar spondylosis, carpal tunnel syndrome, test results. Patient says the back pain was aggravated after the fall, pain is intermittent, sometimes persistent, burning sensation that goes down to the leg causing numbness and tingling sensation. .  Patient noted that he cannot walk  long distance without experiencing the back pain but medication helps a lot. He is still experiencing cramp in the right leg mostly when the pain is in the back, when he rests the pain gets better and the cramping is somewhat relieved. Medication helps a lot, he does not take the medication all the time just as needed and it helps when the pain is severe. He continues to have numbness and tingling sensation of the arms that goes down to the hands causing some weakness of the hands, sometimes has some difficulty holding on for times. Patient had physical therapy which he said helped a lot. He is not ready for surgery yet. He uses hand brace at times when the numbness and tingling sensation gets worse. He says minimal amount of pain medication helps with his pain. On a scale of 1-10, patient rates his pain to be between 6 and 7.   After reviewing patient's , and UDS, patient does not misuse abuse his pain medication, he was given Percocet 10/325 as needed for pain until he gets into pain management specialist.  Review of Systems - General ROS: positive for  - fatigue and sleep disturbance  Psychological ROS: positive for - anxiety and sleep disturbances  Ophthalmic ROS: positive for - decreased vision and uses glasses  ENT ROS: negative  Allergy and Immunology ROS: negative  Hematological and Lymphatic ROS: negative  Endocrine ROS: negative  Respiratory ROS: no cough, shortness of breath, or wheezing  Cardiovascular ROS: no chest pain or dyspnea on exertion  Gastrointestinal ROS: no abdominal pain, change in bowel habits, or black or bloody stools  Genito-Urinary ROS: no dysuria, trouble voiding, or hematuria  Musculoskeletal ROS: positive for - gait disturbance, joint pain, joint stiffness, joint swelling, muscle pain and muscular weakness  Neurological ROS: positive for - dizziness, gait disturbance, impaired coordination/balance, numbness/tingling, visual changes and weakness  Dermatological ROS: negative         Medications reviewed:  Current Outpatient Medications   Medication Sig Dispense Refill    oxyCODONE-acetaminophen (PERCOCET)  mg per tablet Take  by mouth every eight (8) hours as needed for Pain.  metFORMIN ER (GLUCOPHAGE XR) 500 mg tablet Take 1,000 mg by mouth two (2) times a day.  lisinopril (PRINIVIL, ZESTRIL) 20 mg tablet Take 20 mg by mouth daily.  triamterene-hydroCHLOROthiazide (MAXZIDE) 37.5-25 mg per tablet Take 1 Tab by mouth daily.  ASPIRIN PO Take 81 mg by mouth daily.  amLODIPine (NORVASC) 5 mg tablet Take 5 mg by mouth daily.  POTASSIUM CHLORIDE PO Take 20 mEq by mouth daily. ER micro      esomeprazole magnesium (NEXIUM PO) Take 22.5 mg by mouth daily.  Cholecalciferol, Vitamin D3, (VITAMIN D3) 2,000 unit cap capsule Take 2,000 Units by mouth daily.  cyanocobalamin (VITAMIN B-12) 1,000 mcg tablet Take 1,000 mcg by mouth daily.  labetalol (NORMODYNE) 300 mg tablet Take 900 mg by mouth two (2) times a day.  glipiZIDE (GLUCOTROL) 5 mg tablet Take 5 mg by mouth two (2) times a day.  tamsulosin (FLOMAX) 0.4 mg capsule Take 0.4 mg by mouth daily.  lovastatin (MEVACOR) 20 mg tablet Take 20 mg by mouth nightly.  dexAMETHasone (DECADRON) 4 mg tablet Take 4 mg by mouth two (2) times daily (with meals). 20 Tab 1    predniSONE (DELTASONE) 5 mg tablet Take 1 Tab by mouth two (2) times a day.  20 Tab 0    OTHER Hand brace  Bilateral cts 2 Units 0        Objective:   Vitals:  Vitals:    10/31/19 1131   BP: 102/65   Pulse: 91   Resp: 18   Temp: 98.4 °F (36.9 °C)   TempSrc: Temporal   SpO2: 90%   Weight: 191 lb 9.6 oz (86.9 kg)   Height: 5' 11\" (1.803 m)   PainSc:   6   PainLoc: Leg               Lab Data Reviewed:  Lab Results   Component Value Date/Time    WBC 11.0 09/05/2016 09:41 AM    HCT 31.4 (L) 09/05/2016 09:41 AM    HGB 11.1 (L) 09/05/2016 09:41 AM    PLATELET 844 62/92/7272 09:41 AM       Lab Results   Component Value Date/Time    Sodium 137 09/05/2016 09:41 AM    Potassium 3.3 (L) 09/05/2016 09:41 AM    Chloride 101 09/05/2016 09:41 AM    CO2 30 09/05/2016 09:41 AM    Glucose 158 (H) 09/05/2016 09:41 AM    BUN 17 09/05/2016 09:41 AM    Creatinine 1.06 09/05/2016 09:41 AM    Calcium 9.1 09/05/2016 09:41 AM       No components found for: TROPQUANT    No results found for: LAUREN      No results found for: HBA1C, HGBE8, [id number], NUS2VCVH     Lab Results   Component Value Date/Time    Vitamin B12 599 07/11/2018 09:15 AM       No results found for: LAUREN, ANARX, ANAIGG, XBANA    No results found for: CHOL, CHOLPOCT, CHOLX, CHLST, CHOLV, HDL, HDLPOC, HDLP, LDL, LDLCPOC, LDLC, DLDLP, VLDLC, VLDL, TGLX, TRIGL, TRIGP, TGLPOCT, CHHD, CHHDX      CT Results (recent):  Results from [place] encounter on 09/05/16   CTA CHEST W WO CONT    Narrative INDICATION:  chest pain r/o dissections and     EXAM:  CTA Chest with contrast     COMPARISON:  None    TECHNIQUE:  Following the uneventful intravenous administration of Iodinated  contrast, thin helical axial images were obtained through the chest. 3D image  postprocessing was performed. Coronal and sagittal reformatted images were  obtained. CT dose reduction was achieved through use of a standardized protocol  tailored for this examination and automatic exposure control for dose  modulation.      FINDINGS:    Thoracic aorta is normal in caliber without evidence for dissection or  significant stenosis. Great vessel origins are patent. No evidence for acute pulmonary embolism. Mild to moderate emphysema. Bibasilar atelectasis. No other significant lung  abnormality. The central airways are patent. Tiny pericardial effusion. No pleural effusion. No thoracic lymphadenopathy. Nonspecific thickening of the adrenal glands. A couple low-density renal lesions are too small to characterize. A tiny  hyperenhancing focus in the liver are nonspecific (series 3, images 98). No acute osseous abnormality. Degenerative changes in the lower cervical spine  with grade 1/2 anterolisthesis of C7 on T1. Impression IMPRESSION:    No evidence for thoracic aortic aneurysm or dissection. No evidence for acute  pulmonary embolism. Mild to moderate emphysema. Tiny pericardial effusion. Nonspecific thickening of the adrenal glands. Tiny hyperenhancing focus in the liver is nonspecific. Degenerative changes in the lower cervical spine with grade 1/2 anterolisthesis  of C7 on T1. MRI Results (recent):  Results from [place] encounter on 07/10/19   MRI LUMB SPINE WO CONT    Narrative EXAM: MRI LUMB SPINE WO CONT    INDICATION: Radiculopathy. Chronic bilateral lower back pain    COMPARISON: 7.21.2018    TECHNIQUE: MR imaging of the lumbar spine was performed using the following  sequences: sagittal T1, T2, STIR;  axial T1, T2.     CONTRAST:  None. FINDINGS:    There is normal alignment of the lumbar spine. Vertebral body heights are  maintained. Marrow signal is stable. The conus medullaris terminates at T12. Signal and caliber of the distal spinal  cord are within normal limits. The paraspinal soft tissues are within normal limits. Lower thoracic spine: No herniation or stenosis. L1-L2: No herniation or stenosis. Mild disc desiccation and disc bulge. L2-L3: There is a mild disc bulge.  Bilateral facet arthropathy and ligamentum  flavum hypertrophy is noted. Central canal measures 11 mm anterior to posterior  (series 601, image 17). Mild bilateral neural foraminal stenosis. L3-L4: Broad disc bulge, bilateral ligamentum flavum hypertrophy and bilateral  facet distraction. Associated ligamentum flavum hypertrophy. Central canal  measures 10.6 mm anterior to posterior. Moderate bilateral neural foraminal  stenosis (series 601, image 12). L4-L5: Broad disc bulge. Bilateral ligamentum flavum hypertrophy and bilateral  facet distraction. Central canal measures 9 mm anterior to posterior. Moderate  severe bilateral neural foraminal stenosis (series 601, image 7). L5-S1: Broad disc bulge. Right canal extrusion versus free fragment that  measures 8 x 7.6 x 10 mm in size (series 601, image 2). Severe right and  moderate severe left neural foraminal stenosis. Impression IMPRESSION:    New right-sided 1 cm extrusion versus free fragment at the L5-S1 level,  impinging the nerve root centrally. Moderate bilateral L3-4, moderate severe bilateral L4-5 and left L5-S1 and  severe right L5-S1 neural foraminal stenosis. 23X       IR Results (recent):  No results found for this or any previous visit. VAS/US Results (recent):  No results found for this or any previous visit. PHYSICAL EXAM:  General:    Alert, cooperative, no distress, appears stated age. Head:   Normocephalic, without obvious abnormality, atraumatic. Eyes:   Conjunctivae/corneas clear. PERRLA  Nose:  Nares normal. No drainage or sinus tenderness. Throat:    Lips, mucosa, and tongue normal.  No Thrush  Neck:  Supple, symmetrical,  no adenopathy, thyroid: non tender    no carotid bruit and no JVD. Back:    Symmetric, bilateral tenderness. Lungs:   Clear to auscultation bilaterally. No Wheezing or Rhonchi. No rales. Chest wall:  No tenderness or deformity. No Accessory muscle use. Heart:   Regular rate and rhythm,  no murmur, rub or gallop. Abdomen:   Soft, non-tender. Not distended. Bowel sounds normal. No masses  Extremities: Extremities normal, atraumatic, No cyanosis. No edema. No clubbing  Skin:     Texture, turgor normal. No rashes or lesions. Not Jaundiced  Lymph nodes: Cervical, supraclavicular normal.  Psych:  Good insight. Not depressed. Not anxious or agitated. NEUROLOGICAL EXAM:  Appearance: The patient is well developed, well nourished, provides a coherent history and is in no acute distress. Mental Status: Oriented to time, place and person. Mood and affect appropriate. Cranial Nerves:   Intact visual fields. Fundi are benign. HECTOR, EOM's full, no nystagmus, no ptosis. Facial sensation is normal. Corneal reflexes are intact. Facial movement is symmetric. Hearing is normal bilaterally. Palate is midline with normal sternocleidomastoid and trapezius muscles are normal. Tongue is midline. Motor:  5/5 strength in upper and lower proximal and distal muscles. Normal bulk and tone. No fasciculations. Reflexes:   Deep tendon reflexes 2+/4 and symmetrical.   Sensory:    Dysesthesia to touch, pinprick and vibration. Gait:   Slightly unsteady gait. Tremor:   No tremor noted. Cerebellar:  No cerebellar signs present. Neurovascular:  Normal heart sounds and regular rhythm, peripheral pulses intact, and no carotid bruits. Assesment  1. Lumbar radiculopathy  Continue management    2. Polyneuropathy  Continue management    3. Paresthesia  Stable    4. Bilateral carpal tunnel syndrome  Awaiting carpal tunnel release    5. Chronic pain syndrome  Continue management    ___________________________________________________  PLAN:      ICD-10-CM ICD-9-CM    1. Lumbar radiculopathy M54.16 724.4    2. Polyneuropathy G62.9 356.9    3. Paresthesia R20.2 782.0    4. Bilateral carpal tunnel syndrome G56.03 354.0    5. Chronic pain syndrome G89.4 338.4      Follow-up and Dispositions    · Return in about 3 months (around 1/31/2020). ___________________________________________________    Attending Physician: Zain Powers MD

## 2019-11-06 LAB — DRUGS UR: NORMAL

## 2020-02-07 ENCOUNTER — OFFICE VISIT (OUTPATIENT)
Dept: NEUROLOGY | Age: 70
End: 2020-02-07

## 2020-02-07 VITALS
WEIGHT: 189.6 LBS | DIASTOLIC BLOOD PRESSURE: 77 MMHG | HEIGHT: 71 IN | SYSTOLIC BLOOD PRESSURE: 146 MMHG | TEMPERATURE: 97.8 F | OXYGEN SATURATION: 96 % | HEART RATE: 75 BPM | RESPIRATION RATE: 16 BRPM | BODY MASS INDEX: 26.54 KG/M2

## 2020-02-07 DIAGNOSIS — M54.16 LUMBAR RADICULOPATHY: Primary | ICD-10-CM

## 2020-02-07 DIAGNOSIS — G89.29 CHRONIC BILATERAL LOW BACK PAIN WITHOUT SCIATICA: ICD-10-CM

## 2020-02-07 DIAGNOSIS — M54.50 CHRONIC BILATERAL LOW BACK PAIN WITHOUT SCIATICA: ICD-10-CM

## 2020-02-07 DIAGNOSIS — G62.9 POLYNEUROPATHY: ICD-10-CM

## 2020-02-07 DIAGNOSIS — G56.03 BILATERAL CARPAL TUNNEL SYNDROME: ICD-10-CM

## 2020-02-07 DIAGNOSIS — M79.18 MYOFASCIAL MUSCLE PAIN: ICD-10-CM

## 2020-02-07 RX ORDER — DEXAMETHASONE 4 MG/1
4 TABLET ORAL 2 TIMES DAILY WITH MEALS
Qty: 20 TAB | Refills: 1 | Status: SHIPPED | OUTPATIENT
Start: 2020-02-07 | End: 2020-09-18

## 2020-02-07 RX ORDER — BACLOFEN 10 MG/1
10 TABLET ORAL 3 TIMES DAILY
Qty: 90 TAB | Refills: 1 | Status: SHIPPED | OUTPATIENT
Start: 2020-02-07 | End: 2020-08-02

## 2020-02-07 NOTE — PROGRESS NOTES
Chief Complaint   Patient presents with    Neurologic Problem     Polyneuropathy     1. Have you been to the ER, urgent care clinic since your last visit? Hospitalized since your last visit? no    2. Have you seen or consulted any other health care providers outside of the [address] [place] [address] since your last visit? Include any pap smears or colon screening.  no

## 2020-02-07 NOTE — PROGRESS NOTES
Neurology Progress Note    NAME:  Lauryn Roberson   :   1950   MRN:   [id number]     Date/Time:  2020  Subjective:      Jayesh Fink is a 68 y.o. male here today for follow-up for neuropathy, back pain, radiculopathy, lumbar spondylosis, carpal tunnel syndrome . Patient today says he has been having severe back pain because apparently he must have lifted his brother in bad position that is bending in the wrong position, since then, patient says pain has been severe running down the legs. I told patient that he might benefit from physical therapy but at this time, patient wants to see if it will improve, he also does not want to be evaluated for surgical manipulation. Patient was given back the muscle relaxant baclofen. Decadron 4 mg p.o. twice daily for 10 days. He is still experiencing cramp in the right leg mostly when the pain is in the back, when he rests the pain gets better and the cramping is somewhat relieved. Medication helps a lot, he does not take the medication all the time just as needed and it helps when the pain is severe. He continues to have numbness and tingling sensation of the arms that goes down to the hands causing some weakness of the hands, sometimes has some difficulty holding on for times. Patient had physical therapy which he said helped a lot. He is not ready for surgery yet. He uses hand brace at times when the numbness and tingling sensation gets worse. He says minimal amount of pain medication helps with his pain. On a scale of 1-10, patient rates his pain to be between 6 and 7.   After reviewing patient's , and UDS, patient does not misuse abuse his pain medication, he was given Percocet 10/325 as needed for pain until he gets into pain management specialist.  Review of Systems - General ROS: positive for  - fatigue and sleep disturbance  Psychological ROS: positive for - anxiety and sleep disturbances  Ophthalmic ROS: positive for - decreased vision and uses glasses  ENT ROS: negative  Allergy and Immunology ROS: negative  Hematological and Lymphatic ROS: negative  Endocrine ROS: negative  Respiratory ROS: no cough, shortness of breath, or wheezing  Cardiovascular ROS: no chest pain or dyspnea on exertion  Gastrointestinal ROS: no abdominal pain, change in bowel habits, or black or bloody stools  Genito-Urinary ROS: no dysuria, trouble voiding, or hematuria  Musculoskeletal ROS: positive for - gait disturbance, joint pain, joint stiffness, joint swelling, muscle pain and muscular weakness  Neurological ROS: positive for - dizziness, gait disturbance, impaired coordination/balance, numbness/tingling, visual changes and weakness  Dermatological ROS: negative     Medications reviewed:  Current Outpatient Medications   Medication Sig Dispense Refill    metFORMIN ER (GLUCOPHAGE XR) 500 mg tablet Take 1,000 mg by mouth two (2) times a day.  lisinopril (PRINIVIL, ZESTRIL) 20 mg tablet Take 20 mg by mouth daily.  triamterene-hydroCHLOROthiazide (MAXZIDE) 37.5-25 mg per tablet Take 1 Tab by mouth daily.  ASPIRIN PO Take 81 mg by mouth daily.  amLODIPine (NORVASC) 5 mg tablet Take 5 mg by mouth daily.  POTASSIUM CHLORIDE PO Take 20 mEq by mouth daily. ER micro      esomeprazole magnesium (NEXIUM PO) Take 22.5 mg by mouth daily.  Cholecalciferol, Vitamin D3, (VITAMIN D3) 2,000 unit cap capsule Take 2,000 Units by mouth daily.  cyanocobalamin (VITAMIN B-12) 1,000 mcg tablet Take 1,000 mcg by mouth daily.  labetalol (NORMODYNE) 300 mg tablet Take 900 mg by mouth two (2) times a day.  glipiZIDE (GLUCOTROL) 5 mg tablet Take 5 mg by mouth two (2) times a day.  tamsulosin (FLOMAX) 0.4 mg capsule Take 0.4 mg by mouth daily.  lovastatin (MEVACOR) 20 mg tablet Take 20 mg by mouth nightly.  dexAMETHasone (DECADRON) 4 mg tablet Take 4 mg by mouth two (2) times daily (with meals).  20 Tab 1    predniSONE (DELTASONE) 5 mg tablet Take 1 Tab by mouth two (2) times a day. 20 Tab 0    OTHER Hand brace  Bilateral cts 2 Units 0        Objective:   Vitals:  Vitals:    02/07/20 1059   BP: 146/77   Pulse: 75   Resp: 16   Temp: 97.8 °F (36.6 °C)   TempSrc: Temporal   SpO2: 96%   Weight: 189 lb 9.6 oz (86 kg)   Height: 5' 11\" (1.803 m)   PainSc:   6   PainLoc: Back     Lab Data Reviewed:  Lab Results   Component Value Date/Time    WBC 11.0 09/05/2016 09:41 AM    HCT 31.4 (L) 09/05/2016 09:41 AM    HGB 11.1 (L) 09/05/2016 09:41 AM    PLATELET 092 50/49/9051 09:41 AM       Lab Results   Component Value Date/Time    Sodium 137 09/05/2016 09:41 AM    Potassium 3.3 (L) 09/05/2016 09:41 AM    Chloride 101 09/05/2016 09:41 AM    CO2 30 09/05/2016 09:41 AM    Glucose 158 (H) 09/05/2016 09:41 AM    BUN 17 09/05/2016 09:41 AM    Creatinine 1.06 09/05/2016 09:41 AM    Calcium 9.1 09/05/2016 09:41 AM       No components found for: TROPQUANT    No results found for: LAUREN      No results found for: HBA1C, HGBE8, HSW1DYDM, SQN4HROA     Lab Results   Component Value Date/Time    Vitamin B12 599 07/11/2018 09:15 AM       No results found for: LAUREN, ANARX, ANAIGG, XBANA    No results found for: CHOL, CHOLPOCT, CHOLX, CHLST, CHOLV, HDL, HDLPOC, HDLP, LDL, LDLCPOC, LDLC, DLDLP, VLDLC, VLDL, TGLX, TRIGL, TRIGP, TGLPOCT, CHHD, CHHDX      CT Results (recent):  Results from [place] encounter on 09/05/16   CTA CHEST W WO CONT    Narrative INDICATION:  chest pain r/o dissections and     EXAM:  CTA Chest with contrast     COMPARISON:  None    TECHNIQUE:  Following the uneventful intravenous administration of Iodinated  contrast, thin helical axial images were obtained through the chest. 3D image  postprocessing was performed. Coronal and sagittal reformatted images were  obtained. CT dose reduction was achieved through use of a standardized protocol  tailored for this examination and automatic exposure control for dose  modulation.      FINDINGS:    Thoracic aorta is normal in caliber without evidence for dissection or  significant stenosis. Great vessel origins are patent. No evidence for acute pulmonary embolism. Mild to moderate emphysema. Bibasilar atelectasis. No other significant lung  abnormality. The central airways are patent. Tiny pericardial effusion. No pleural effusion. No thoracic lymphadenopathy. Nonspecific thickening of the adrenal glands. A couple low-density renal lesions are too small to characterize. A tiny  hyperenhancing focus in the liver are nonspecific (series 3, images 98). No acute osseous abnormality. Degenerative changes in the lower cervical spine  with grade 1/2 anterolisthesis of C7 on T1. Impression IMPRESSION:    No evidence for thoracic aortic aneurysm or dissection. No evidence for acute  pulmonary embolism. Mild to moderate emphysema. Tiny pericardial effusion. Nonspecific thickening of the adrenal glands. Tiny hyperenhancing focus in the liver is nonspecific. Degenerative changes in the lower cervical spine with grade 1/2 anterolisthesis  of C7 on T1. MRI Results (recent):  Results from [place] encounter on 07/10/19   MRI LUMB SPINE WO CONT    Narrative EXAM: MRI LUMB SPINE WO CONT    INDICATION: Radiculopathy. Chronic bilateral lower back pain    COMPARISON: 7.21.2018    TECHNIQUE: MR imaging of the lumbar spine was performed using the following  sequences: sagittal T1, T2, STIR;  axial T1, T2.     CONTRAST:  None. FINDINGS:    There is normal alignment of the lumbar spine. Vertebral body heights are  maintained. Marrow signal is stable. The conus medullaris terminates at T12. Signal and caliber of the distal spinal  cord are within normal limits. The paraspinal soft tissues are within normal limits. Lower thoracic spine: No herniation or stenosis. L1-L2: No herniation or stenosis. Mild disc desiccation and disc bulge.     L2-L3: There is a mild disc bulge. Bilateral facet arthropathy and ligamentum  flavum hypertrophy is noted. Central canal measures 11 mm anterior to posterior  (series 601, image 17). Mild bilateral neural foraminal stenosis. L3-L4: Broad disc bulge, bilateral ligamentum flavum hypertrophy and bilateral  facet distraction. Associated ligamentum flavum hypertrophy. Central canal  measures 10.6 mm anterior to posterior. Moderate bilateral neural foraminal  stenosis (series 601, image 12). L4-L5: Broad disc bulge. Bilateral ligamentum flavum hypertrophy and bilateral  facet distraction. Central canal measures 9 mm anterior to posterior. Moderate  severe bilateral neural foraminal stenosis (series 601, image 7). L5-S1: Broad disc bulge. Right canal extrusion versus free fragment that  measures 8 x 7.6 x 10 mm in size (series 601, image 2). Severe right and  moderate severe left neural foraminal stenosis. Impression IMPRESSION:    New right-sided 1 cm extrusion versus free fragment at the L5-S1 level,  impinging the nerve root centrally. Moderate bilateral L3-4, moderate severe bilateral L4-5 and left L5-S1 and  severe right L5-S1 neural foraminal stenosis. 23X       IR Results (recent):  No results found for this or any previous visit. VAS/US Results (recent):  No results found for this or any previous visit. PHYSICAL EXAM:  General:    Alert, cooperative, no distress, appears stated age. Head:   Normocephalic, without obvious abnormality, atraumatic. Eyes:   Conjunctivae/corneas clear. PERRLA  Nose:  Nares normal. No drainage or sinus tenderness. Throat:    Lips, mucosa, and tongue normal.  No Thrush  Neck:  Supple, symmetrical,  no adenopathy, thyroid: non tender    no carotid bruit and no JVD. Back:    Symmetric, bilateral tenderness. Lungs:   Clear to auscultation bilaterally. No Wheezing or Rhonchi. No rales. Chest wall:  No tenderness or deformity. No Accessory muscle use.   Heart:   Regular rate and rhythm, no murmur, rub or gallop. Abdomen:   Soft, non-tender. Not distended. Bowel sounds normal. No masses  Extremities: Extremities normal, atraumatic, No cyanosis. No edema. No clubbing  Skin:     Texture, turgor normal. No rashes or lesions. Not Jaundiced  Lymph nodes: Cervical, supraclavicular normal.  Psych:  Good insight. Not depressed. Not anxious or agitated. NEUROLOGICAL EXAM:  Appearance: The patient is well developed, well nourished, provides a coherent history and is in no acute distress. Mental Status: Oriented to time, place and person. Mood and affect appropriate. Cranial Nerves:   Intact visual fields. Fundi are benign. HECTOR, EOM's full, no nystagmus, no ptosis. Facial sensation is normal. Corneal reflexes are intact. Facial movement is symmetric. Hearing is normal bilaterally. Palate is midline with normal sternocleidomastoid and trapezius muscles are normal. Tongue is midline. Motor:  5/5 strength in upper and lower proximal and distal muscles. Normal bulk and tone. No fasciculations. Reflexes:   Deep tendon reflexes 2+/4 and symmetrical.   Sensory:    Dysesthesia to touch, pinprick and vibration. Gait:   Slightly unsteady gait. Tremor:   No tremor noted. Cerebellar:  No cerebellar signs present. Neurovascular:  Normal heart sounds and regular rhythm, peripheral pulses intact, and no carotid bruits. Assesment  1. Lumbar radiculopathy  Continue management  Will benefit from physical therapy    2. Polyneuropathy  Continue management    3. Chronic bilateral low back pain without sciatica  Physical therapy  Referred to pain management  4. Bilateral carpal tunnel syndrome  Referred to hand surgeon    5. Myofascial muscle pain  Continue management  Referred to pain management  ___________________________________________________  PLAN: Medication and plan discussed with patient      ICD-10-CM ICD-9-CM    1. Lumbar radiculopathy M54.16 724.4    2.  Polyneuropathy G62.9 356.9 3. Chronic bilateral low back pain without sciatica M54.5 724.2     G89.29 338.29    4. Bilateral carpal tunnel syndrome G56.03 354.0    5. Myofascial muscle pain M79.18 729.1      Follow-up and Dispositions    · Return in about 3 months (around 5/7/2020).                ___________________________________________________    Attending Physician: Kamini Yan MD

## 2020-05-07 ENCOUNTER — VIRTUAL VISIT (OUTPATIENT)
Dept: NEUROLOGY | Age: 70
End: 2020-05-07

## 2020-05-07 VITALS — WEIGHT: 183 LBS | BODY MASS INDEX: 25.62 KG/M2 | HEIGHT: 71 IN

## 2020-05-07 DIAGNOSIS — G62.9 NEUROPATHY: ICD-10-CM

## 2020-05-07 DIAGNOSIS — M54.50 CHRONIC BILATERAL LOW BACK PAIN WITHOUT SCIATICA: ICD-10-CM

## 2020-05-07 DIAGNOSIS — M79.18 MYOFASCIAL MUSCLE PAIN: ICD-10-CM

## 2020-05-07 DIAGNOSIS — G56.03 BILATERAL CARPAL TUNNEL SYNDROME: ICD-10-CM

## 2020-05-07 DIAGNOSIS — G89.29 CHRONIC BILATERAL LOW BACK PAIN WITHOUT SCIATICA: ICD-10-CM

## 2020-05-07 DIAGNOSIS — M54.16 LUMBAR RADICULOPATHY: Primary | ICD-10-CM

## 2020-05-07 NOTE — PATIENT INSTRUCTIONS
A Healthy Lifestyle: Care Instructions Your Care Instructions A healthy lifestyle can help you feel good, stay at a healthy weight, and have plenty of energy for both work and play. A healthy lifestyle is something you can share with your whole family. A healthy lifestyle also can lower your risk for serious health problems, such as high blood pressure, heart disease, and diabetes. You can follow a few steps listed below to improve your health and the health of your family. Follow-up care is a key part of your treatment and safety. Be sure to make and go to all appointments, and call your doctor if you are having problems. It's also a good idea to know your test results and keep a list of the medicines you take. How can you care for yourself at home? · Do not eat too much sugar, fat, or fast foods. You can still have dessert and treats now and then. The goal is moderation. · Start small to improve your eating habits. Pay attention to portion sizes, drink less juice and soda pop, and eat more fruits and vegetables. ? Eat a healthy amount of food. A 3-ounce serving of meat, for example, is about the size of a deck of cards. Fill the rest of your plate with vegetables and whole grains. ? Limit the amount of soda and sports drinks you have every day. Drink more water when you are thirsty. ? Eat at least 5 servings of fruits and vegetables every day. It may seem like a lot, but it is not hard to reach this goal. A serving or helping is 1 piece of fruit, 1 cup of vegetables, or 2 cups of leafy, raw vegetables. Have an apple or some carrot sticks as an afternoon snack instead of a candy bar. Try to have fruits and/or vegetables at every meal. 
· Make exercise part of your daily routine. You may want to start with simple activities, such as walking, bicycling, or slow swimming. Try to be active 30 to 60 minutes every day.  You do not need to do all 30 to 60 minutes all at once. For example, you can exercise 3 times a day for 10 or 20 minutes. Moderate exercise is safe for most people, but it is always a good idea to talk to your doctor before starting an exercise program. 
· Keep moving. Derinda Leyden the lawn, work in the garden, or [employer]. Take the stairs instead of the elevator at work. · If you smoke, quit. People who smoke have an increased risk for heart attack, stroke, cancer, and other lung illnesses. Quitting is hard, but there are ways to boost your chance of quitting tobacco for good. ? Use nicotine gum, patches, or lozenges. ? Ask your doctor about stop-smoking programs and medicines. ? Keep trying. In addition to reducing your risk of diseases in the future, you will notice some benefits soon after you stop using tobacco. If you have shortness of breath or asthma symptoms, they will likely get better within a few weeks after you quit. · Limit how much alcohol you drink. Moderate amounts of alcohol (up to 2 drinks a day for men, 1 drink a day for women) are okay. But drinking too much can lead to liver problems, high blood pressure, and other health problems. Family health If you have a family, there are many things you can do together to improve your health. · Eat meals together as a family as often as possible. · Eat healthy foods. This includes fruits, vegetables, lean meats and dairy, and whole grains. · Include your family in your fitness plan. Most people think of activities such as jogging or tennis as the way to fitness, but there are many ways you and your family can be more active. Anything that makes you breathe hard and gets your heart pumping is exercise. Here are some tips: 
? Walk to do errands or to take your child to school or the bus. 
? Go for a family bike ride after dinner instead of watching TV. Where can you learn more? Go to http://altaf-emerald.info/ Enter T336 in the search box to learn more about \"A Healthy Lifestyle: Care Instructions. \" Current as of: May 28, 2019Content Version: 12.4 © 1637-1639 [employer]. Care instructions adapted under license by [employer] (which disclaims liability or warranty for this information). If you have questions about a medical condition or this instruction, always ask your healthcare professional. [address] any warranty or liability for your use of this information. Office Policies 
 
o Phone calls/patient messages: Please allow up to 24 hours for someone in the office to contact you about your call or message. Be mindful your provider may be out of the office or your message may require further review. We encourage you to use [employer] for your messages as this is a faster, more efficient way to communicate with our office 
 
o Medication Refills: 
Prescription medications require up to 48 business hours to process. We encourage you to use [employer] for your refills. For controlled medications: Please allow up to 72 business hours to process. Certain medications may require you to  a written prescription at our office. NO narcotic/controlled medications will be prescribed after 4pm Monday through Friday or on weekends 
 
o Form/Paperwork Completion: We ask that you allow 7-14 business days. You may also download your forms to [employer] to have your doctor print off.

## 2020-08-02 RX ORDER — BACLOFEN 10 MG/1
TABLET ORAL
Qty: 90 TAB | Refills: 0 | Status: SHIPPED | OUTPATIENT
Start: 2020-08-02 | End: 2020-09-18

## 2020-09-09 ENCOUNTER — OFFICE VISIT (OUTPATIENT)
Dept: NEUROLOGY | Age: 70
End: 2020-09-09
Payer: MEDICARE

## 2020-09-09 VITALS
HEIGHT: 71 IN | TEMPERATURE: 97.5 F | BODY MASS INDEX: 26.04 KG/M2 | HEART RATE: 82 BPM | RESPIRATION RATE: 18 BRPM | DIASTOLIC BLOOD PRESSURE: 70 MMHG | WEIGHT: 186 LBS | OXYGEN SATURATION: 98 % | SYSTOLIC BLOOD PRESSURE: 110 MMHG

## 2020-09-09 DIAGNOSIS — G56.03 BILATERAL CARPAL TUNNEL SYNDROME: ICD-10-CM

## 2020-09-09 DIAGNOSIS — M54.50 CHRONIC BILATERAL LOW BACK PAIN WITHOUT SCIATICA: ICD-10-CM

## 2020-09-09 DIAGNOSIS — M79.18 MYOFASCIAL MUSCLE PAIN: ICD-10-CM

## 2020-09-09 DIAGNOSIS — M54.16 LUMBAR RADICULOPATHY: Primary | ICD-10-CM

## 2020-09-09 DIAGNOSIS — G89.29 CHRONIC BILATERAL LOW BACK PAIN WITHOUT SCIATICA: ICD-10-CM

## 2020-09-09 DIAGNOSIS — G89.4 CHRONIC PAIN SYNDROME: ICD-10-CM

## 2020-09-09 DIAGNOSIS — G62.9 POLYNEUROPATHY: ICD-10-CM

## 2020-09-09 PROCEDURE — G8510 SCR DEP NEG, NO PLAN REQD: HCPCS | Performed by: PSYCHIATRY & NEUROLOGY

## 2020-09-09 PROCEDURE — 3017F COLORECTAL CA SCREEN DOC REV: CPT | Performed by: PSYCHIATRY & NEUROLOGY

## 2020-09-09 PROCEDURE — G8427 DOCREV CUR MEDS BY ELIG CLIN: HCPCS | Performed by: PSYCHIATRY & NEUROLOGY

## 2020-09-09 PROCEDURE — 99214 OFFICE O/P EST MOD 30 MIN: CPT | Performed by: PSYCHIATRY & NEUROLOGY

## 2020-09-09 PROCEDURE — G8419 CALC BMI OUT NRM PARAM NOF/U: HCPCS | Performed by: PSYCHIATRY & NEUROLOGY

## 2020-09-09 PROCEDURE — G8536 NO DOC ELDER MAL SCRN: HCPCS | Performed by: PSYCHIATRY & NEUROLOGY

## 2020-09-09 PROCEDURE — 1101F PT FALLS ASSESS-DOCD LE1/YR: CPT | Performed by: PSYCHIATRY & NEUROLOGY

## 2020-09-09 NOTE — PROGRESS NOTES
Neurology Progress Note    NAME:  Bora Lobo   :   1950   MRN:   [id number]     Date/Time:  2020  Subjective:     Natalee Roberson is a 65 y.o. male here today for follow-up for neuropathy, back pain, radiculopathy, lumbar spondylosis, carpal tunnel syndrome  Patient says he has been doing fairly well, however, he still experiences periodic back pain mostly with activity. He said that he had an episode of severe pain however it improved with medication. He is yet to start back on physical therapy. There was no fall. He continues to experience numbness and tingling sensation of the arms that goes down to the hands causing some weakness of the hands, sometimes has some difficulty holding on for times. He uses hand brace at times when the numbness and tingling sensation get worse. He says minimal amount of pain medication helps with his pain. On a scale of 1-10, patient rates his pain level to be between 5 and 6.   Review of Systems - General ROS: positive for  - fatigue and sleep disturbance  Psychological ROS: positive for - anxiety and sleep disturbances  Ophthalmic ROS: positive for - decreased vision and uses glasses  ENT ROS: negative  Allergy and Immunology ROS: negative  Hematological and Lymphatic ROS: negative  Endocrine ROS: negative  Respiratory ROS: no cough, shortness of breath, or wheezing  Cardiovascular ROS: no chest pain or dyspnea on exertion  Gastrointestinal ROS: no abdominal pain, change in bowel habits, or black or bloody stools  Genito-Urinary ROS: no dysuria, trouble voiding, or hematuria  Musculoskeletal ROS: positive for - gait disturbance, joint pain, joint stiffness, joint swelling, muscle pain and muscular weakness  Neurological ROS: positive for - dizziness, gait disturbance, impaired coordination/balance, numbness/tingling, visual changes and weakness  Dermatological ROS: negative     Medications reviewed:  Current Outpatient Medications   Medication Sig Dispense Refill    metFORMIN ER (GLUCOPHAGE XR) 500 mg tablet Take 1,000 mg by mouth two (2) times a day.  lisinopril (PRINIVIL, ZESTRIL) 20 mg tablet Take 20 mg by mouth daily.  triamterene-hydroCHLOROthiazide (MAXZIDE) 37.5-25 mg per tablet Take 1 Tab by mouth daily.  ASPIRIN PO Take 81 mg by mouth daily.  amLODIPine (NORVASC) 5 mg tablet Take 5 mg by mouth daily.  POTASSIUM CHLORIDE PO Take 20 mEq by mouth daily. ER micro      esomeprazole magnesium (NEXIUM PO) Take 22.5 mg by mouth daily.  Cholecalciferol, Vitamin D3, (VITAMIN D3) 2,000 unit cap capsule Take 2,000 Units by mouth daily.  cyanocobalamin (VITAMIN B-12) 1,000 mcg tablet Take 1,000 mcg by mouth daily.  labetalol (NORMODYNE) 300 mg tablet Take 900 mg by mouth two (2) times a day.  glipiZIDE (GLUCOTROL) 5 mg tablet Take 5 mg by mouth two (2) times a day.  tamsulosin (FLOMAX) 0.4 mg capsule Take 0.4 mg by mouth daily.  lovastatin (MEVACOR) 20 mg tablet Take 20 mg by mouth nightly.  baclofen (LIORESAL) 10 mg tablet TAKE 1 TABLET BY MOUTH THREE TIMES DAILY 90 Tab 0    dexAMETHasone (DECADRON) 4 mg tablet TAKE 1 TABLET BY MOUTH TWICE DAILY WITH  MEALS 20 Tab 0    predniSONE (DELTASONE) 5 mg tablet Take 1 Tab by mouth two (2) times a day.  20 Tab 0    OTHER Hand brace  Bilateral cts 2 Units 0        Objective:   Vitals:  Vitals:    09/09/20 0926   BP: 110/70   Pulse: 82   Resp: 18   Temp: 97.5 °F (36.4 °C)   TempSrc: Oral   SpO2: 98%   Weight: 186 lb (84.4 kg)   Height: 5' 11\" (1.803 m)   PainSc:   6   PainLoc: Back       Lab Data Reviewed:  Lab Results   Component Value Date/Time    WBC 11.0 09/05/2016 09:41 AM    HCT 31.4 (L) 09/05/2016 09:41 AM    HGB 11.1 (L) 09/05/2016 09:41 AM    PLATELET 286 49/87/7170 09:41 AM       Lab Results   Component Value Date/Time    Sodium 137 09/05/2016 09:41 AM    Potassium 3.3 (L) 09/05/2016 09:41 AM    Chloride 101 09/05/2016 09:41 AM    CO2 30 09/05/2016 09:41 AM Glucose 158 (H) 09/05/2016 09:41 AM    BUN 17 09/05/2016 09:41 AM    Creatinine 1.06 09/05/2016 09:41 AM    Calcium 9.1 09/05/2016 09:41 AM       No components found for: TROPQUANT    No results found for: LAUREN      No results found for: HBA1C, HGBE8, RHJ8IJWJ, SXP8VQTI     Lab Results   Component Value Date/Time    Vitamin B12 599 07/11/2018 09:15 AM       No results found for: LAUREN, ANARX, ANAIGG, XBANA    No results found for: CHOL, CHOLPOCT, CHOLX, CHLST, CHOLV, HDL, HDLPOC, HDLP, LDL, LDLCPOC, LDLC, DLDLP, VLDLC, VLDL, TGLX, TRIGL, TRIGP, TGLPOCT, CHHD, CHHDX      CT Results (recent):  Results from [place] encounter on 09/05/16   CTA CHEST W WO CONT    Narrative INDICATION:  chest pain r/o dissections and     EXAM:  CTA Chest with contrast     COMPARISON:  None    TECHNIQUE:  Following the uneventful intravenous administration of Iodinated  contrast, thin helical axial images were obtained through the chest. 3D image  postprocessing was performed. Coronal and sagittal reformatted images were  obtained. CT dose reduction was achieved through use of a standardized protocol  tailored for this examination and automatic exposure control for dose  modulation. FINDINGS:    Thoracic aorta is normal in caliber without evidence for dissection or  significant stenosis. Great vessel origins are patent. No evidence for acute pulmonary embolism. Mild to moderate emphysema. Bibasilar atelectasis. No other significant lung  abnormality. The central airways are patent. Tiny pericardial effusion. No pleural effusion. No thoracic lymphadenopathy. Nonspecific thickening of the adrenal glands. A couple low-density renal lesions are too small to characterize. A tiny  hyperenhancing focus in the liver are nonspecific (series 3, images 98). No acute osseous abnormality. Degenerative changes in the lower cervical spine  with grade 1/2 anterolisthesis of C7 on T1.       Impression IMPRESSION:    No evidence for thoracic aortic aneurysm or dissection. No evidence for acute  pulmonary embolism. Mild to moderate emphysema. Tiny pericardial effusion. Nonspecific thickening of the adrenal glands. Tiny hyperenhancing focus in the liver is nonspecific. Degenerative changes in the lower cervical spine with grade 1/2 anterolisthesis  of C7 on T1. MRI Results (recent):  Results from [place] encounter on 07/10/19   MRI LUMB SPINE WO CONT    Narrative EXAM: MRI LUMB SPINE WO CONT    INDICATION: Radiculopathy. Chronic bilateral lower back pain    COMPARISON: 7.21.2018    TECHNIQUE: MR imaging of the lumbar spine was performed using the following  sequences: sagittal T1, T2, STIR;  axial T1, T2.     CONTRAST:  None. FINDINGS:    There is normal alignment of the lumbar spine. Vertebral body heights are  maintained. Marrow signal is stable. The conus medullaris terminates at T12. Signal and caliber of the distal spinal  cord are within normal limits. The paraspinal soft tissues are within normal limits. Lower thoracic spine: No herniation or stenosis. L1-L2: No herniation or stenosis. Mild disc desiccation and disc bulge. L2-L3: There is a mild disc bulge. Bilateral facet arthropathy and ligamentum  flavum hypertrophy is noted. Central canal measures 11 mm anterior to posterior  (series 601, image 17). Mild bilateral neural foraminal stenosis. L3-L4: Broad disc bulge, bilateral ligamentum flavum hypertrophy and bilateral  facet distraction. Associated ligamentum flavum hypertrophy. Central canal  measures 10.6 mm anterior to posterior. Moderate bilateral neural foraminal  stenosis (series 601, image 12). L4-L5: Broad disc bulge. Bilateral ligamentum flavum hypertrophy and bilateral  facet distraction. Central canal measures 9 mm anterior to posterior. Moderate  severe bilateral neural foraminal stenosis (series 601, image 7). L5-S1: Broad disc bulge.  Right canal extrusion versus free fragment that  measures 8 x 7.6 x 10 mm in size (series 601, image 2). Severe right and  moderate severe left neural foraminal stenosis. Impression IMPRESSION:    New right-sided 1 cm extrusion versus free fragment at the L5-S1 level,  impinging the nerve root centrally. Moderate bilateral L3-4, moderate severe bilateral L4-5 and left L5-S1 and  severe right L5-S1 neural foraminal stenosis. 23X       IR Results (recent):  No results found for this or any previous visit. VAS/US Results (recent):  No results found for this or any previous visit. PHYSICAL EXAM:  General:    Alert, cooperative, no distress, appears stated age. Head:   Normocephalic, without obvious abnormality, atraumatic. Eyes:   Conjunctivae/corneas clear. PERRLA  Nose:  Nares normal. No drainage or sinus tenderness. Throat:    Lips, mucosa, and tongue normal.  No Thrush  Neck:  Supple, symmetrical,  no adenopathy, thyroid: non tender    no carotid bruit and no JVD. Back:    Symmetric, bilateral tenderness. Lungs:   Clear to auscultation bilaterally. No Wheezing or Rhonchi. No rales. Chest wall:  No tenderness or deformity. No Accessory muscle use. Heart:   Regular rate and rhythm,  no murmur, rub or gallop. Abdomen:   Soft, non-tender. Not distended. Bowel sounds normal. No masses  Extremities: Extremities normal, atraumatic, No cyanosis. No edema. No clubbing  Skin:     Texture, turgor normal. No rashes or lesions. Not Jaundiced  Lymph nodes: Cervical, supraclavicular normal.  Psych:  Good insight. Not depressed. Not anxious or agitated. NEUROLOGICAL EXAM:  Appearance: The patient is well developed, well nourished, provides a coherent history and is in no acute distress. Mental Status: Oriented to time, place and person. Mood and affect appropriate. Cranial Nerves:   Intact visual fields. Fundi are benign. HECTOR, EOM's full, no nystagmus, no ptosis.  Facial sensation is normal. Corneal reflexes are intact. Facial movement is symmetric. Hearing is normal bilaterally. Palate is midline with normal sternocleidomastoid and trapezius muscles are normal. Tongue is midline. Motor:  5/5 strength in upper and lower proximal and distal muscles. Normal bulk and tone. No fasciculations. Reflexes:   Deep tendon reflexes 1+/4 and symmetrical.   Sensory:    Decreased sensation to touch, pinprick and vibration bilateral lower extremity up to the knee, upper extremity up to the mid forearm. Gait:  Normal gait. Tremor:   No tremor noted. Cerebellar:  No cerebellar signs present. Neurovascular:  Normal heart sounds and regular rhythm, peripheral pulses intact, and no carotid bruits. Assesment  1. Lumbar radiculopathy  Physical therapy    2. Polyneuropathy  Continue management    3. Myofascial muscle pain  Continue management    4. Bilateral carpal tunnel syndrome  Awaiting carpal tunnel release    5. Chronic bilateral low back pain without sciatica  Physical therapy    6. Chronic pain syndrome  Referred to pain management    ___________________________________________________  PLAN: Medication and plan discussed with patient      ICD-10-CM ICD-9-CM    1. Lumbar radiculopathy  M54.16 724.4    2. Polyneuropathy  G62.9 356.9    3. Myofascial muscle pain  M79.18 729.1    4. Bilateral carpal tunnel syndrome  G56.03 354.0    5. Chronic bilateral low back pain without sciatica  M54.5 724.2     G89.29 338.29    6. Chronic pain syndrome  G89.4 338.4      Follow-up and Dispositions    · Return in about 6 months (around 3/9/2021).            ___________________________________________________    Attending Physician: Iris Olivier MD

## 2020-09-18 RX ORDER — DEXAMETHASONE 4 MG/1
TABLET ORAL
Qty: 20 TAB | Refills: 0 | Status: SHIPPED | OUTPATIENT
Start: 2020-09-18

## 2020-09-18 RX ORDER — BACLOFEN 10 MG/1
TABLET ORAL
Qty: 90 TAB | Refills: 0 | Status: SHIPPED | OUTPATIENT
Start: 2020-09-18 | End: 2021-01-28

## 2021-01-28 RX ORDER — BACLOFEN 10 MG/1
TABLET ORAL
Qty: 90 TAB | Refills: 0 | Status: SHIPPED | OUTPATIENT
Start: 2021-01-28 | End: 2021-03-09 | Stop reason: SDUPTHER

## 2021-03-09 ENCOUNTER — OFFICE VISIT (OUTPATIENT)
Dept: NEUROLOGY | Age: 71
End: 2021-03-09
Payer: MEDICARE

## 2021-03-09 VITALS
SYSTOLIC BLOOD PRESSURE: 110 MMHG | DIASTOLIC BLOOD PRESSURE: 68 MMHG | TEMPERATURE: 97.8 F | HEART RATE: 79 BPM | OXYGEN SATURATION: 97 % | HEIGHT: 71 IN | WEIGHT: 190 LBS | BODY MASS INDEX: 26.6 KG/M2

## 2021-03-09 DIAGNOSIS — G89.4 CHRONIC PAIN SYNDROME: ICD-10-CM

## 2021-03-09 DIAGNOSIS — M79.18 MYOFASCIAL MUSCLE PAIN: ICD-10-CM

## 2021-03-09 DIAGNOSIS — M54.16 LUMBAR RADICULOPATHY: ICD-10-CM

## 2021-03-09 DIAGNOSIS — G62.9 POLYNEUROPATHY: Primary | ICD-10-CM

## 2021-03-09 DIAGNOSIS — G56.03 BILATERAL CARPAL TUNNEL SYNDROME: ICD-10-CM

## 2021-03-09 PROCEDURE — G8427 DOCREV CUR MEDS BY ELIG CLIN: HCPCS | Performed by: PSYCHIATRY & NEUROLOGY

## 2021-03-09 PROCEDURE — G8419 CALC BMI OUT NRM PARAM NOF/U: HCPCS | Performed by: PSYCHIATRY & NEUROLOGY

## 2021-03-09 PROCEDURE — 1101F PT FALLS ASSESS-DOCD LE1/YR: CPT | Performed by: PSYCHIATRY & NEUROLOGY

## 2021-03-09 PROCEDURE — G8536 NO DOC ELDER MAL SCRN: HCPCS | Performed by: PSYCHIATRY & NEUROLOGY

## 2021-03-09 PROCEDURE — 3017F COLORECTAL CA SCREEN DOC REV: CPT | Performed by: PSYCHIATRY & NEUROLOGY

## 2021-03-09 PROCEDURE — G8510 SCR DEP NEG, NO PLAN REQD: HCPCS | Performed by: PSYCHIATRY & NEUROLOGY

## 2021-03-09 PROCEDURE — 99214 OFFICE O/P EST MOD 30 MIN: CPT | Performed by: PSYCHIATRY & NEUROLOGY

## 2021-03-09 RX ORDER — BACLOFEN 10 MG/1
TABLET ORAL
Qty: 270 TAB | Refills: 4 | Status: SHIPPED | OUTPATIENT
Start: 2021-03-09 | End: 2022-02-15

## 2021-03-09 RX ORDER — BACLOFEN 10 MG/1
TABLET ORAL
Qty: 90 TAB | Refills: 5 | Status: SHIPPED | OUTPATIENT
Start: 2021-03-09 | End: 2021-03-09 | Stop reason: SDUPTHER

## 2021-03-09 NOTE — PROGRESS NOTES
1. Have you been to the ER, urgent care clinic since your last visit? Hospitalized since your last visit? Jenaro Webster 9/28/20; infection; Had MRI at [facility] on Feb 28, 2021    2. Have you seen or consulted any other health care providers outside of the [address] [place] [address] since your last visit? Include any pap smears or colon screening.  [facility]    6 mo fu; lumbar radiculopathy; poly neuropathy    Visit Vitals  /68 (BP 1 Location: Left upper arm, BP Patient Position: Sitting)   Pulse 79   Temp 97.8 °F (36.6 °C) (Temporal)   Ht 5' 11\" (1.803 m)   Wt 190 lb (86.2 kg)   SpO2 97%   BMI 26.50 kg/m²

## 2021-03-09 NOTE — PROGRESS NOTES
Neurology Progress Note    NAME:  Zenaida Feng   :   1950   MRN:   [id number]     Date/Time:  3/9/2021  Subjective:    Hermilo Wiggins is a 65 y.o. male here today for follow-up for neuropathy, back pain, radiculopathy, lumbar spondylosis, carpal tunnel syndrome  Patient says he had had hidradenitis surgery 2 times because of possible fistula, otherwise he has been doing about the same  He still experiences periodic back pain mostly with activity. He said that he had an episode of severe pain however it improved with medication. He is yet to start back on physical therapy due to COVID-19 pandemic crisis. No fall reported  He is still experiencing  numbness and tingling sensation of the arms that goes down to the hands causing some weakness of the hands, sometimes has some difficulty holding on for times. He uses hand brace at times when the numbness and tingling sensation get worse, he is not ready for carpal tunnel release yet. He says minimal amount of pain medication helps with his pain.   Review of Systems - General ROS: positive for  - fatigue and sleep disturbance  Psychological ROS: positive for - anxiety and sleep disturbances  Ophthalmic ROS: positive for - decreased vision and uses glasses  ENT ROS: negative  Allergy and Immunology ROS: negative  Hematological and Lymphatic ROS: negative  Endocrine ROS: negative  Respiratory ROS: no cough, shortness of breath, or wheezing  Cardiovascular ROS: no chest pain or dyspnea on exertion  Gastrointestinal ROS: no abdominal pain, change in bowel habits, or black or bloody stools  Genito-Urinary ROS: no dysuria, trouble voiding, or hematuria  Musculoskeletal ROS: positive for - gait disturbance, joint pain, joint stiffness, joint swelling, muscle pain and muscular weakness  Neurological ROS: positive for - dizziness, gait disturbance, impaired coordination/balance, numbness/tingling, visual changes and weakness  Dermatological ROS: negative Medications reviewed:  Current Outpatient Medications   Medication Sig Dispense Refill    baclofen (LIORESAL) 10 mg tablet TAKE 1 TABLET BY MOUTH THREE TIMES DAILY 270 Tab 4    metFORMIN ER (GLUCOPHAGE XR) 500 mg tablet Take 1,000 mg by mouth two (2) times a day.  lisinopril (PRINIVIL, ZESTRIL) 20 mg tablet Take 20 mg by mouth daily.  triamterene-hydroCHLOROthiazide (MAXZIDE) 37.5-25 mg per tablet Take 1 Tab by mouth daily.  ASPIRIN PO Take 81 mg by mouth daily.  amLODIPine (NORVASC) 5 mg tablet Take 5 mg by mouth daily.  POTASSIUM CHLORIDE PO Take 20 mEq by mouth daily. ER micro      esomeprazole magnesium (NEXIUM PO) Take 22.5 mg by mouth daily.  Cholecalciferol, Vitamin D3, (VITAMIN D3) 2,000 unit cap capsule Take 2,000 Units by mouth daily.  cyanocobalamin (VITAMIN B-12) 1,000 mcg tablet Take 1,000 mcg by mouth daily.  labetalol (NORMODYNE) 300 mg tablet Take 900 mg by mouth two (2) times a day.  glipiZIDE (GLUCOTROL) 5 mg tablet Take 5 mg by mouth two (2) times a day.  tamsulosin (FLOMAX) 0.4 mg capsule Take 0.4 mg by mouth daily.  lovastatin (MEVACOR) 20 mg tablet Take 20 mg by mouth nightly.  dexAMETHasone (DECADRON) 4 mg tablet TAKE 1 TABLET BY MOUTH TWICE DAILY WITH  MEALS 20 Tab 0    predniSONE (DELTASONE) 5 mg tablet Take 1 Tab by mouth two (2) times a day.  20 Tab 0    OTHER Hand brace  Bilateral cts 2 Units 0        Objective:   Vitals:  Vitals:    03/09/21 1125   BP: 110/68   Pulse: 79   Temp: 97.8 °F (36.6 °C)   TempSrc: Temporal   SpO2: 97%   Weight: 190 lb (86.2 kg)   Height: 5' 11\" (1.803 m)   PainSc:   0 - No pain               Lab Data Reviewed:  Lab Results   Component Value Date/Time    WBC 11.0 09/05/2016 09:41 AM    HCT 31.4 (L) 09/05/2016 09:41 AM    HGB 11.1 (L) 09/05/2016 09:41 AM    PLATELET 102 75/62/5303 09:41 AM       Lab Results   Component Value Date/Time    Sodium 137 09/05/2016 09:41 AM    Potassium 3.3 (L) 09/05/2016 09:41 AM    Chloride 101 09/05/2016 09:41 AM    CO2 30 09/05/2016 09:41 AM    Glucose 158 (H) 09/05/2016 09:41 AM    BUN 17 09/05/2016 09:41 AM    Creatinine 1.06 09/05/2016 09:41 AM    Calcium 9.1 09/05/2016 09:41 AM       No components found for: TROPQUANT    No results found for: LAUREN      No results found for: HBA1C, HGBE8, [id number], [id number]     Lab Results   Component Value Date/Time    Vitamin B12 599 07/11/2018 09:15 AM       No results found for: LAUREN, ANARX, ANAIGG, XBANA    No results found for: CHOL, CHOLPOCT, CHOLX, CHLST, CHOLV, HDL, HDLPOC, HDLP, LDL, LDLCPOC, LDLC, DLDLP, VLDLC, VLDL, TGLX, TRIGL, TRIGP, TGLPOCT, CHHD, CHHDX      CT Results (recent):  Results from [place] encounter on 09/05/16   CTA CHEST W WO CONT    Narrative INDICATION:  chest pain r/o dissections and     EXAM:  CTA Chest with contrast     COMPARISON:  None    TECHNIQUE:  Following the uneventful intravenous administration of Iodinated  contrast, thin helical axial images were obtained through the chest. 3D image  postprocessing was performed. Coronal and sagittal reformatted images were  obtained. CT dose reduction was achieved through use of a standardized protocol  tailored for this examination and automatic exposure control for dose  modulation. FINDINGS:    Thoracic aorta is normal in caliber without evidence for dissection or  significant stenosis. Great vessel origins are patent. No evidence for acute pulmonary embolism. Mild to moderate emphysema. Bibasilar atelectasis. No other significant lung  abnormality. The central airways are patent. Tiny pericardial effusion. No pleural effusion. No thoracic lymphadenopathy. Nonspecific thickening of the adrenal glands. A couple low-density renal lesions are too small to characterize. A tiny  hyperenhancing focus in the liver are nonspecific (series 3, images 98). No acute osseous abnormality.  Degenerative changes in the lower cervical spine  with grade 1/2 anterolisthesis of C7 on T1. Impression IMPRESSION:    No evidence for thoracic aortic aneurysm or dissection. No evidence for acute  pulmonary embolism. Mild to moderate emphysema. Tiny pericardial effusion. Nonspecific thickening of the adrenal glands. Tiny hyperenhancing focus in the liver is nonspecific. Degenerative changes in the lower cervical spine with grade 1/2 anterolisthesis  of C7 on T1. MRI Results (recent):  Results from [place] encounter on 07/10/19   MRI LUMB SPINE WO CONT    Narrative EXAM: MRI LUMB SPINE WO CONT    INDICATION: Radiculopathy. Chronic bilateral lower back pain    COMPARISON: 7.21.2018    TECHNIQUE: MR imaging of the lumbar spine was performed using the following  sequences: sagittal T1, T2, STIR;  axial T1, T2.     CONTRAST:  None. FINDINGS:    There is normal alignment of the lumbar spine. Vertebral body heights are  maintained. Marrow signal is stable. The conus medullaris terminates at T12. Signal and caliber of the distal spinal  cord are within normal limits. The paraspinal soft tissues are within normal limits. Lower thoracic spine: No herniation or stenosis. L1-L2: No herniation or stenosis. Mild disc desiccation and disc bulge. L2-L3: There is a mild disc bulge. Bilateral facet arthropathy and ligamentum  flavum hypertrophy is noted. Central canal measures 11 mm anterior to posterior  (series 601, image 17). Mild bilateral neural foraminal stenosis. L3-L4: Broad disc bulge, bilateral ligamentum flavum hypertrophy and bilateral  facet distraction. Associated ligamentum flavum hypertrophy. Central canal  measures 10.6 mm anterior to posterior. Moderate bilateral neural foraminal  stenosis (series 601, image 12). L4-L5: Broad disc bulge. Bilateral ligamentum flavum hypertrophy and bilateral  facet distraction. Central canal measures 9 mm anterior to posterior.  Moderate  severe bilateral neural foraminal stenosis (series 601, image 7). L5-S1: Broad disc bulge. Right canal extrusion versus free fragment that  measures 8 x 7.6 x 10 mm in size (series 601, image 2). Severe right and  moderate severe left neural foraminal stenosis. Impression IMPRESSION:    New right-sided 1 cm extrusion versus free fragment at the L5-S1 level,  impinging the nerve root centrally. Moderate bilateral L3-4, moderate severe bilateral L4-5 and left L5-S1 and  severe right L5-S1 neural foraminal stenosis. 23X       IR Results (recent):  No results found for this or any previous visit. VAS/US Results (recent):  No results found for this or any previous visit. PHYSICAL EXAM:  General:    Alert, cooperative, no distress, appears stated age. Head:   Normocephalic, without obvious abnormality, atraumatic. Eyes:   Conjunctivae/corneas clear. PERRLA  Nose:  Nares normal. No drainage or sinus tenderness. Throat:    Lips, mucosa, and tongue normal.  No Thrush  Neck:  Supple, symmetrical,  no adenopathy, thyroid: non tender    no carotid bruit and no JVD. Back:    Symmetric, bilateral tenderness. Lungs:   Clear to auscultation bilaterally. No Wheezing or Rhonchi. No rales. Chest wall:  No tenderness or deformity. No Accessory muscle use. Heart:   Regular rate and rhythm,  no murmur, rub or gallop. Abdomen:   Soft, non-tender. Not distended. Bowel sounds normal. No masses  Extremities: Extremities normal, atraumatic, No cyanosis. No edema. No clubbing  Skin:     Texture, turgor normal. No rashes or lesions. Not Jaundiced  Lymph nodes: Cervical, supraclavicular normal.  Psych:  Good insight. Not depressed. Not anxious or agitated. NEUROLOGICAL EXAM:  Appearance: The patient is well developed, well nourished, provides a coherent history and is in no acute distress. Mental Status: Oriented to time, place and person. Mood and affect appropriate. Cranial Nerves:   Intact visual fields.  Fundi are benign. HECTOR, EOM's full, no nystagmus, no ptosis. Facial sensation is normal. Corneal reflexes are intact. Facial movement is symmetric. Hearing is normal bilaterally. Palate is midline with normal sternocleidomastoid and trapezius muscles are normal. Tongue is midline. Motor:  5/5 strength in upper and lower proximal and distal muscles. Normal bulk and tone. No fasciculations. Reflexes:   Deep tendon reflexes 1+/4 and symmetrical.   Sensory:    Decreased sensation to touch, pinprick and vibration bilateral lower extremity up to the knee, upper extremity up to the mid forearm. Gait:  Normal gait. Tremor:   No tremor noted. Cerebellar:  No cerebellar signs present. Neurovascular:  Normal heart sounds and regular rhythm, peripheral pulses intact, and no carotid bruits. Assesment  1. Polyneuropathy  Continue Neurontin    2. Bilateral carpal tunnel syndrome  Awaiting carpal tunnel surgery    3. Lumbar radiculopathy  Physical therapy    4. Chronic pain syndrome  Stable    5. Myofascial muscle pain  Physical therapy    ___________________________________________________  PLAN: Medication and plan discussed with patient      ICD-10-CM ICD-9-CM    1. Polyneuropathy  G62.9 356.9    2. Bilateral carpal tunnel syndrome  G56.03 354.0    3. Lumbar radiculopathy  M54.16 724.4    4. Chronic pain syndrome  G89.4 338.4    5. Myofascial muscle pain  M79.18 729.1      Follow-up and Dispositions    · Return in about 6 months (around 9/9/2021).          About 35 minutes was spent with patient, half of which was on counseling    ___________________________________________________    Attending Physician: Angelique Vasquez MD                 Slightly unsteady  Pain

## 2021-07-16 ENCOUNTER — ANESTHESIA EVENT (OUTPATIENT)
Dept: SURGERY | Age: 71
End: 2021-07-16
Payer: MEDICARE

## 2021-07-20 ENCOUNTER — HOSPITAL ENCOUNTER (OUTPATIENT)
Age: 71
Setting detail: OUTPATIENT SURGERY
Discharge: HOME | End: 2021-07-20
Attending: INTERNAL MEDICINE | Admitting: INTERNAL MEDICINE
Payer: MEDICARE

## 2021-07-20 ENCOUNTER — ANESTHESIA (OUTPATIENT)
Dept: SURGERY | Age: 71
End: 2021-07-20
Payer: MEDICARE

## 2021-07-20 VITALS
OXYGEN SATURATION: 97 % | BODY MASS INDEX: 26.46 KG/M2 | RESPIRATION RATE: 19 BRPM | HEART RATE: 75 BPM | WEIGHT: 189 LBS | TEMPERATURE: 97.8 F | SYSTOLIC BLOOD PRESSURE: 177 MMHG | DIASTOLIC BLOOD PRESSURE: 94 MMHG | HEIGHT: 71 IN

## 2021-07-20 LAB
GLUCOSE BLD STRIP.AUTO-MCNC: 173 MG/DL (ref 65–117)
SERVICE CMNT-IMP: ABNORMAL

## 2021-07-20 PROCEDURE — 76060000031 HC ANESTHESIA FIRST 0.5 HR: Performed by: INTERNAL MEDICINE

## 2021-07-20 PROCEDURE — 76210000020 HC REC RM PH II FIRST 0.5 HR: Performed by: INTERNAL MEDICINE

## 2021-07-20 PROCEDURE — 74011250636 HC RX REV CODE- 250/636: Performed by: ANESTHESIOLOGY

## 2021-07-20 PROCEDURE — 88312 SPECIAL STAINS GROUP 1: CPT

## 2021-07-20 PROCEDURE — 88305 TISSUE EXAM BY PATHOLOGIST: CPT

## 2021-07-20 PROCEDURE — 88342 IMHCHEM/IMCYTCHM 1ST ANTB: CPT

## 2021-07-20 PROCEDURE — 76010000154 HC OR TIME FIRST 0.5 HR: Performed by: INTERNAL MEDICINE

## 2021-07-20 PROCEDURE — 74011000250 HC RX REV CODE- 250: Performed by: INTERNAL MEDICINE

## 2021-07-20 PROCEDURE — 74011000250 HC RX REV CODE- 250: Performed by: ANESTHESIOLOGY

## 2021-07-20 PROCEDURE — 2709999900 HC NON-CHARGEABLE SUPPLY: Performed by: INTERNAL MEDICINE

## 2021-07-20 PROCEDURE — 77030019988 HC FCPS ENDOSC DISP BSC -B: Performed by: INTERNAL MEDICINE

## 2021-07-20 PROCEDURE — 76210000063 HC OR PH I REC FIRST 0.5 HR: Performed by: INTERNAL MEDICINE

## 2021-07-20 PROCEDURE — 82962 GLUCOSE BLOOD TEST: CPT

## 2021-07-20 RX ORDER — SODIUM CHLORIDE, SODIUM LACTATE, POTASSIUM CHLORIDE, CALCIUM CHLORIDE 600; 310; 30; 20 MG/100ML; MG/100ML; MG/100ML; MG/100ML
50 INJECTION, SOLUTION INTRAVENOUS CONTINUOUS
Status: DISCONTINUED | OUTPATIENT
Start: 2021-07-20 | End: 2021-07-20 | Stop reason: HOSPADM

## 2021-07-20 RX ORDER — FLUMAZENIL 0.1 MG/ML
0.2 INJECTION INTRAVENOUS
Status: DISCONTINUED | OUTPATIENT
Start: 2021-07-20 | End: 2021-07-20 | Stop reason: HOSPADM

## 2021-07-20 RX ORDER — LIDOCAINE HYDROCHLORIDE 10 MG/ML
0.1 INJECTION, SOLUTION EPIDURAL; INFILTRATION; INTRACAUDAL; PERINEURAL AS NEEDED
Status: DISCONTINUED | OUTPATIENT
Start: 2021-07-20 | End: 2021-07-20 | Stop reason: HOSPADM

## 2021-07-20 RX ORDER — LIDOCAINE HYDROCHLORIDE 20 MG/ML
INJECTION, SOLUTION INFILTRATION; PERINEURAL AS NEEDED
Status: DISCONTINUED | OUTPATIENT
Start: 2021-07-20 | End: 2021-07-20 | Stop reason: HOSPADM

## 2021-07-20 RX ORDER — SODIUM CHLORIDE 0.9 % (FLUSH) 0.9 %
5-40 SYRINGE (ML) INJECTION AS NEEDED
Status: DISCONTINUED | OUTPATIENT
Start: 2021-07-20 | End: 2021-07-20 | Stop reason: HOSPADM

## 2021-07-20 RX ORDER — EPINEPHRINE 0.1 MG/ML
1 INJECTION INTRACARDIAC; INTRAVENOUS
Status: DISCONTINUED | OUTPATIENT
Start: 2021-07-20 | End: 2021-07-20 | Stop reason: HOSPADM

## 2021-07-20 RX ORDER — LIDOCAINE HYDROCHLORIDE 20 MG/ML
5 SOLUTION OROPHARYNGEAL AS NEEDED
Status: DISCONTINUED | OUTPATIENT
Start: 2021-07-20 | End: 2021-07-20 | Stop reason: HOSPADM

## 2021-07-20 RX ORDER — FENTANYL CITRATE 50 UG/ML
100 INJECTION, SOLUTION INTRAMUSCULAR; INTRAVENOUS ONCE
Status: DISCONTINUED | OUTPATIENT
Start: 2021-07-20 | End: 2021-07-20 | Stop reason: HOSPADM

## 2021-07-20 RX ORDER — NALOXONE HYDROCHLORIDE 0.4 MG/ML
0.4 INJECTION, SOLUTION INTRAMUSCULAR; INTRAVENOUS; SUBCUTANEOUS
Status: DISCONTINUED | OUTPATIENT
Start: 2021-07-20 | End: 2021-07-20 | Stop reason: HOSPADM

## 2021-07-20 RX ORDER — DEXTROMETHORPHAN/PSEUDOEPHED 2.5-7.5/.8
1.2 DROPS ORAL
Status: DISCONTINUED | OUTPATIENT
Start: 2021-07-20 | End: 2021-07-20 | Stop reason: HOSPADM

## 2021-07-20 RX ORDER — SODIUM CHLORIDE 9 MG/ML
100 INJECTION, SOLUTION INTRAVENOUS CONTINUOUS
Status: DISCONTINUED | OUTPATIENT
Start: 2021-07-20 | End: 2021-07-20 | Stop reason: HOSPADM

## 2021-07-20 RX ORDER — MIDAZOLAM HYDROCHLORIDE 1 MG/ML
5 INJECTION, SOLUTION INTRAMUSCULAR; INTRAVENOUS
Status: DISCONTINUED | OUTPATIENT
Start: 2021-07-20 | End: 2021-07-20 | Stop reason: HOSPADM

## 2021-07-20 RX ORDER — DEXTROSE MONOHYDRATE AND SODIUM CHLORIDE 5; .9 G/100ML; G/100ML
100 INJECTION, SOLUTION INTRAVENOUS CONTINUOUS
Status: DISCONTINUED | OUTPATIENT
Start: 2021-07-20 | End: 2021-07-20 | Stop reason: HOSPADM

## 2021-07-20 RX ORDER — SODIUM CHLORIDE 9 MG/ML
50 INJECTION, SOLUTION INTRAVENOUS CONTINUOUS
Status: DISCONTINUED | OUTPATIENT
Start: 2021-07-20 | End: 2021-07-20 | Stop reason: HOSPADM

## 2021-07-20 RX ORDER — SODIUM CHLORIDE 0.9 % (FLUSH) 0.9 %
5-40 SYRINGE (ML) INJECTION EVERY 8 HOURS
Status: DISCONTINUED | OUTPATIENT
Start: 2021-07-20 | End: 2021-07-20 | Stop reason: HOSPADM

## 2021-07-20 RX ORDER — SODIUM CHLORIDE, SODIUM LACTATE, POTASSIUM CHLORIDE, CALCIUM CHLORIDE 600; 310; 30; 20 MG/100ML; MG/100ML; MG/100ML; MG/100ML
INJECTION, SOLUTION INTRAVENOUS
Status: DISCONTINUED | OUTPATIENT
Start: 2021-07-20 | End: 2021-07-20 | Stop reason: HOSPADM

## 2021-07-20 RX ORDER — PROPOFOL 10 MG/ML
INJECTION, EMULSION INTRAVENOUS AS NEEDED
Status: DISCONTINUED | OUTPATIENT
Start: 2021-07-20 | End: 2021-07-20 | Stop reason: HOSPADM

## 2021-07-20 RX ORDER — ATROPINE SULFATE 1 MG/ML
0.5 INJECTION, SOLUTION INTRAVENOUS
Status: DISCONTINUED | OUTPATIENT
Start: 2021-07-20 | End: 2021-07-20 | Stop reason: HOSPADM

## 2021-07-20 RX ADMIN — PROPOFOL 10 MG: 10 INJECTION, EMULSION INTRAVENOUS at 09:54

## 2021-07-20 RX ADMIN — PROPOFOL 10 MG: 10 INJECTION, EMULSION INTRAVENOUS at 09:59

## 2021-07-20 RX ADMIN — LIDOCAINE HYDROCHLORIDE 60 MG: 20 INJECTION, SOLUTION INFILTRATION; PERINEURAL at 09:53

## 2021-07-20 RX ADMIN — SODIUM CHLORIDE, POTASSIUM CHLORIDE, SODIUM LACTATE AND CALCIUM CHLORIDE: 600; 310; 30; 20 INJECTION, SOLUTION INTRAVENOUS at 09:29

## 2021-07-20 RX ADMIN — PROPOFOL 10 MG: 10 INJECTION, EMULSION INTRAVENOUS at 09:55

## 2021-07-20 RX ADMIN — PROPOFOL 10 MG: 10 INJECTION, EMULSION INTRAVENOUS at 09:57

## 2021-07-20 RX ADMIN — PROPOFOL 10 MG: 10 INJECTION, EMULSION INTRAVENOUS at 09:58

## 2021-07-20 RX ADMIN — PROPOFOL 100 MG: 10 INJECTION, EMULSION INTRAVENOUS at 09:53

## 2021-07-20 RX ADMIN — PROPOFOL 10 MG: 10 INJECTION, EMULSION INTRAVENOUS at 09:56

## 2021-07-20 NOTE — PERIOP NOTES
Patient meets discharge criteria. Patient and Chrissie Cogan friend provided with verbal and written discharge instructions. Patient discharged by wheelchair with Chrissie Cogan friend to transport home.

## 2021-07-20 NOTE — PROCEDURES
G I Procedure Note                         EGD    Dr. Holger Pak office   [facility]                              [id number]                                 [id number]   1950                       66 y.o.                male        Procedure Date: 7/20/2021   Procedure  EGD  with biopsy. [x]  Anesthesia MAC           Pre Op Diagnosis  Indications:                     1. NAUSEA   GERD                                                                                                                                                                          Post Op Diagnosis:                    1.   GASTRITIS, 4CM HIATAL HERNIA, ESOPHAGEAL NODULES                                                                        H&p completed  Yes    Anesthesia Assessment Performed prior to procedure:No change   Medications Medication Record            c  Description of Procedure:  YARIEL@  was seen in the endoscopy suite and the pre procedure evaluation was completed. The patient was identified as Monster Mcdonald  and the procedure verified as EGD with biopsy. A Time Out was held and the above information confirmed. The risks, benefits, complications, treatment options and expected outcomes were discussed with the patient. The possibilities of reaction to medication, pulmonary aspiration, perforation of a viscus, bleeding, failure to diagnose a condition and creating a complication requiring transfusion or operation were discussed with the patient who  Permit obtained and risks explained ( [id number])     Procedure Note:  With the patient in the left lateral position and after appropriate conscious anesthesia the Olympus Video endoscope was passed under direct vision into the oropharynx.   The oropharynx appeared Normal   The instrument was advanced into the esophagus and the findings were      [x] normal mucosa [x] hiatal hernia 4cm      [] normal z line  . The instrument was advanced into the stomach through the esophagogastric junction. The gastroscope was advanced progressively through the stomach visualizing the body and antral areas. The findings were a moderate gastritis with erosions. A biopsy was obtained. The instrument was further advanced through the pylorus into the duodenum. The bulb of the duodenum and the second portion of the duodenum were examined and the findings were a smooth appearing duodenal mucosa with mild erythema. The endoscope was withdrawn back into the stomach and retroflexed with examination of the fundus and cardia and the findings were no additional abnormalities . The instrument was withdrawn back into the esophagus and the the finding were no additional abnormalities    Biopsies were obtained for helicobacter testing and pathologic analysis from the representative areas. The endoscope was completely withdrawn and the patient tolerated the procedure well. 1.Blood Loss nil  No complications  Anesthesia  MAC  No crystalloids  No Implants  Assistants : per nursing documentation team members     2. For biopsy Specimen verification by physician and nurse two sources name, social security number    Suggestions  Plan 1. - Acid suppression with a proton pump inhibitor.  - Await pathology. - Await JAY test result and treat for Helicobacter pylori if positive. Add carafate   Ct abdomen paul Medeiros MD

## 2021-07-20 NOTE — H&P
G I Procedure Note           Endoscopy History and Physical               Dr. Tavia Becerra Office [address] Office  [facility] [phone] [phone]  [id number]    1950  66 y.o.  male      Date of Procedure:   Preoperative Diagnosis:       Procedure:   7/20/2021           NAUSEA   GERD                              Procedure(s):  ESOPHAGOGASTRODUODENOSCOPY (EGD)      Gastroenterologist:  Anesthesia:           MD SEGUN Hoskins            History and procedure indication:  Lawyer Peña is a 66 y.o. BLACK/ male who presents with: NAUSEA   GERD   including the additional history of Heartburn and Nausea ,,GERD, Heartburn, persistent despite therapy,        Past Medical History:   Diagnosis Date    [facility]     High cholesterol     Hypertension     Joint pain     Muscle pain     Muscle weakness     Visual disturbance       Prior to Admission medications    Medication Sig Start Date End Date Taking? Authorizing Provider   baclofen (LIORESAL) 10 mg tablet TAKE 1 TABLET BY MOUTH THREE TIMES DAILY 3/9/21   Cortes Leon MD   dexAMETHasone (DECADRON) 4 mg tablet TAKE 1 TABLET BY MOUTH TWICE DAILY WITH  MEALS 9/18/20   Cortes Leon MD   predniSONE (DELTASONE) 5 mg tablet Take 1 Tab by mouth two (2) times a day. 4/22/19   Cortes Leon MD   OTHER Hand brace  Bilateral cts 9/5/18   Cortes Leon MD   metFORMIN ER (GLUCOPHAGE XR) 500 mg tablet Take 1,000 mg by mouth two (2) times a day. Provider, Historical   lisinopril (PRINIVIL, ZESTRIL) 20 mg tablet Take 20 mg by mouth daily. Provider, Historical   triamterene-hydroCHLOROthiazide (MAXZIDE) 37.5-25 mg per tablet Take 1 Tab by mouth daily. Provider, Historical   ASPIRIN PO Take 81 mg by mouth daily.     Provider, Historical   amLODIPine (NORVASC) 5 mg tablet Take 5 mg by mouth daily.    Provider, Historical   POTASSIUM CHLORIDE PO Take 20 mEq by mouth daily. ER micro    Provider, Historical   esomeprazole magnesium (NEXIUM PO) Take 22.5 mg by mouth daily. Provider, Historical   Cholecalciferol, Vitamin D3, (VITAMIN D3) 2,000 unit cap capsule Take 2,000 Units by mouth daily. Provider, Historical   cyanocobalamin (VITAMIN B-12) 1,000 mcg tablet Take 1,000 mcg by mouth daily. Provider, Historical   labetalol (NORMODYNE) 300 mg tablet Take 900 mg by mouth two (2) times a day. Provider, Historical   glipiZIDE (GLUCOTROL) 5 mg tablet Take 5 mg by mouth two (2) times a day. Provider, Historical   tamsulosin (FLOMAX) 0.4 mg capsule Take 0.4 mg by mouth daily. Provider, Historical   lovastatin (MEVACOR) 20 mg tablet Take 20 mg by mouth nightly. 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     General appearance:  alert, well appearing, and in no distress  Mental status:  normal mood, behavior, speech, dress, motor activity and thought processes  Nose:      normal and patent, no erythema, discharge or polyps  Mouth:- mucous membranes moist, pharynx normal without lesions                  [x]  No Loose teeth      []    Loose teeth  Finger opening:  []1     []1.5    [] 2     [] 2.5     [x] 3      [] 3.5     [] 4   Mallampati:         [] Class 1     [x] Class 2    [] Class 3      [] Class 4      Neck - supple,      [x] Full ROM [] Decreased ROM  [] Short Neck no significant adenopathy    Chest - clear to auscultation, no wheezes, rales or rhonchi, symmetric air entry  Heart: normal rate, regular rhythm, normal S1, S2, no murmurs, rubs, clicks or gallops  Abdomen: abdomen soft, bowel sounds  [x] normal  [] increased  [] hypoactive                       [] no tenderness  [] epigastric tenderness  [] LLQ tenderness   [] RLQ tenderness                      No masses, organomegaly or guarding. Rectal exam: negative without mass, lesions or tenderness  Extremities: peripheral pulses normal, no pedal edema, no clubbing or cyanosis  Neurologic: Alert and oriented to person, place, and time; normal strength and tone. Normal symmetric reflexes  Normal gait:                                      Assessement:                                 Pre op dx:  NAUSEA   GERD   Additional medical problems list below   There is no problem list on file for this patient. This note documentation was performed prior to this planned procedure       after a history and physical was performed in the office.          Date: 7 2 21   Office exam   7/20/2021 Immediate update no changes in H&P                        Pre Procedure Evaluation (per anesthesia or per h&p)                                                Sedation/Assessment:                                                                                               Mallampati Classification                           []Class 1                    []Class 2                    [] Class 3                  [] Class 4                                              ASA classfication         []     Class I: Normally healthy         []     Class II: Patient with mild systemic disease (e.g. hypertension)         []     Class III: Patient with severe systemic disease (e.g. CHF), non-decompensated         []     Class IV: Patient with severe systemic disease, decompensated         []     Class V: Moribund patient, survival unlikely                     Plan:  []  Egd *                               [] Colonoscopy                               [] with Moderate Sedation /Conscious Sedation                                 [x] MAC          Patient stable for planned procedure. See orders.      Jeff Rivera MD

## 2021-07-20 NOTE — PERIOP NOTES
Handoff Report from Operating Room to PACU    Report received from Dr Katty Johansen and Jatinder Alexis regarding Hensley Pott. Surgeon(s):  Hanna Osborne MD  And Procedure(s) (LRB):  ESOPHAGOGASTRODUODENOSCOPY (EGD) (N/A)  ESOPHAGOGASTRODUODENAL (EGD) BIOPSY (N/A)  confirmed   with allergies discussed. Anesthesia type, drugs, patient history, complications, estimated blood loss, vital signs, intake and output, and lines and temperature were reviewed.

## 2021-07-20 NOTE — DISCHARGE INSTRUCTIONS
Endoscopy Discharge Instructions     Dr. Elder Stark office                                            NAME: Emre Mckinley NUMBER:[id number]    AGE:  66 y.o. YOB: 1950                                                              FINAL Discharge Procedure and Diagnosis:       Procedure(s):  ESOPHAGOGASTRODUODENOSCOPY (EGD)       FINDINGS:     Gastritis   Hiatal hernia                                          MEDICATIONS    [x] CONTINUE CURRENT MEDICATIONS     [] NEW MEDICATIONS           1.    2.    3.         Testing   Schedule                  []  Repeat colonoscopy in 6-12 month         2nd to Inadequate  prep    []  Repeat colonoscopy in 3 years    []  Repeat colonoscopy in 5 years    []  Repeat colonoscopy in 10 years         New additional  Tests  Call the office   [phone] for the appointment time      []      []      []                                     YOUR NEXT APPOINTMENT WITH DR Anup Stinson:                                                                                                                                []   None follow up with pcp   []  1 week       [x]   2 weeks    []  1 month    Always keep Loi Campos MD for regular medical follow up                                                                                                                         If you had a colonoscopy the \"C\" indicates specific instructions        x                                           Diet Instructions :   Ordinarily you may resume your previous diet but your initial diet should be       Light your discharge nurse will go over this with you. Large meals can cause  abdominal discomfort after these procedures. Specific Diet Recommendations:        [x] High fiber diet. https://www.GBS. [employer]/diets/        [] GERD diet: avoid fried and fatty foods, peppermint, chocolate, alcohol,               coffee, citrus fruits and juices, and tomato products. Avoid lying down for            2 to 3  hours after eating. https://www.prince.com/. [employer]/diets/            []  FODMAP DIET  DeathUnit.nl              []  All diets eg high fiber, gastroparesis. , weight loss , gluten free             1. [employer]              2.  [email or web address] [employer]/diets/           __x__  Lilia Miller may feel quite tired and need to rest and recuperate for several hours    following these procedures. __x__  Due to the fact that sedation was administered for this procedure, do not drive,   operate machinery or sign legal documents for the next 24 hours. __x__  Mild abdominal pain may be experienced after your procedure, but is should   disappear after several hours. Notify your physician if you have persistent pain,   tenderness or abdominal distension. __x__  C    Many patients for the first few hours following the exam may experience         belching or passing gas through the rectum. Walking may help to relieve        distention and gas pains. A warm bath or shower will often help with abdominal  cramping.                                                                                            __x__   Lilia Miller may return to your normal routine tomorrow, according to how you feel        and depending on your doctors instructions. Be sure to call your doctor to make  an appointment for a post-surgery check-up on the date your doctor has   requested. __x__ C     Rectal bleeding or spotting in small amounts may occur with the first bowel   movement following a colonoscopy or sigmoidoscopy.  If a large amount of blood is noted call immediately     __x__ You may experience a numbness or lack of sensation in throat. If present, do not     eat or drink. Before eating, test your ability to drink with small sips of water. Y     You may try clear liquids or soups. If you tolerate these, you may then eat solid     food which is not greasy or spicy. __x__ C     IF POLYPS REMOVED: Avoid any blood thinning medication such as plavix,   aspirin or coumadin  NSAIDS (like advil or alleve) for 7 days. __x__  Notify your physician if you cough or vomit blood or experience chest pain. Your biopsy or testing result should be available in 7-10 days                                                                                                                      Prescription will be electronically sent to your pharmacy you must     let your nurse know your pharmacy:                                                                                                                                          [address] [place] [address]. TO HELP ENSURE A SMOOTH RECOVERY,       IT IS IMPORTANT TO FOLLOW THEM. _x___Pamphlet /Educational Information provided for diagnostic findings     Additional education information can assessed at the sites below:   [place]   http://www.digestive. niddk.nih.gov/ddiseases/a-z.asp      Web MD patient information                                                                                                Signature of individual given instructions :   Date: 7/20/2021                                                                                                                                                  DISCHARGE SUMMARY from Nurse    PATIENT INSTRUCTIONS:    After general anesthesia or intravenous sedation, for 24 hours or while taking prescription Narcotics:  · Limit your activities  · Do not drive and operate hazardous machinery  · Do not make important personal or business decisions  · Do  not drink alcoholic beverages  · If you have not urinated within 8 hours after discharge, please contact your surgeon on call. Report the following to your surgeon:  · Excessive pain, swelling, redness or odor of or around the surgical area  · Temperature over 100.5  · Nausea and vomiting lasting longer than 4 hours or if unable to take medications  · Any signs of decreased circulation or nerve impairment to extremity: change in color, persistent  numbness, tingling, coldness or increase pain  · Any questions      These are general instructions for a healthy lifestyle:    No smoking/ No tobacco products/ Avoid exposure to second hand smoke  Surgeon General's Warning:  Quitting smoking now greatly reduces serious risk to your health. Obesity, smoking, and sedentary lifestyle greatly increases your risk for illness    A healthy diet, regular physical exercise & weight monitoring are important for maintaining a healthy lifestyle    You may be retaining fluid if you have a history of heart failure or if you experience any of the following symptoms:  Weight gain of 3 pounds or more overnight or 5 pounds in a week, increased swelling in our hands or feet or shortness of breath while lying flat in bed. Please call your doctor as soon as you notice any of these symptoms; do not wait until your next office visit. The discharge information has been reviewed with the {PATIENT PARENT GUARDIAN:21066}. The {PATIENT PARENT GUARDIAN:08329} verbalized understanding. Discharge medications reviewed with the {Dishcarge meds reviewed LOBO:34009} and appropriate educational materials and side effects teaching were provided.   ___________________________________________________________________________________________________________________________________                Patient Education        Learning About COVID-19 and Flu Symptoms  How can you tell COVID-19 from the flu? COVID-19 and the flu have similar symptoms. The two can be hard to tell apart. The only way to know for sure which illness you have is to be tested. Since the symptoms are so alike, it makes sense to act as if you have COVID-19 until your test results come back. This means staying home and limiting contact with people in your home. You'll need to wash your hands often and disinfect surfaces that you touch. And be sure to wear a mask or face covering when you're around other people. This is also good advice if you think you have the flu. COVID-19 and the flu have these symptoms in common:  · Fever or chills  · Cough  · Shortness of breath  · Fatigue (tiredness)  · Sore throat  · Runny or stuffy nose  · Muscle pain or body aches  · Headache  · Vomiting and diarrhea (more common in children than adults)  COVID-19 has another symptom that also may occur:  · New loss of taste or smell  COVID-19 symptoms may appear from 2 to 14 days after infection. Flu symptoms usually appear 1 to 4 days after infection. Why should you get a flu shot during the COVID-19 pandemic? It's important to get your yearly flu vaccine. Both the flu and COVID-19 are expected to be active during flu season. You can get sick with both infections at once. And having both may make you more sick than getting just one. The flu vaccine won't protect you from COVID-19. But it can help prevent the flu or reduce its symptoms. If fewer people get very ill with the flu, this will help free up medical resources that are needed for COVID-19 patients. Where can you learn more? Go to http://www.Lesson Prep.com/  Enter C123 in the search box to learn more about \"Learning About COVID-19 and Flu Symptoms. \"  Current as of: December 18, 2020               Content Version: 12.8  © 3137-1320 Healthwise, Incorporated.    Care instructions adapted under license by [employer] (which disclaims liability or warranty for this information). If you have questions about a medical condition or this instruction, always ask your healthcare professional. [place] [address] any warranty or liability for your use of this information.

## 2021-07-20 NOTE — ANESTHESIA PREPROCEDURE EVALUATION
Relevant Problems   No relevant active problems       Anesthetic History   No history of anesthetic complications            Review of Systems / Medical History  Patient summary reviewed and pertinent labs reviewed    Pulmonary  Within defined limits                 Neuro/Psych   Within defined limits           Cardiovascular    Hypertension              Exercise tolerance: >4 METS     GI/Hepatic/Renal  Within defined limits              Endo/Other    Diabetes         Other Findings              Physical Exam    Airway  Mallampati: I  TM Distance: > 6 cm  Neck ROM: normal range of motion   Mouth opening: Normal     Cardiovascular    Rhythm: regular  Rate: normal         Dental    Dentition: Upper dentition intact, Lower dentition intact and Poor dentition     Pulmonary  Breath sounds clear to auscultation               Abdominal  GI exam deferred       Other Findings            Anesthetic Plan    ASA: 2  Anesthesia type: MAC            Anesthetic plan and risks discussed with: Patient

## 2021-07-20 NOTE — ANESTHESIA POSTPROCEDURE EVALUATION
Procedure(s):  ESOPHAGOGASTRODUODENOSCOPY (EGD)  ESOPHAGOGASTRODUODENAL (EGD) BIOPSY.     MAC    Anesthesia Post Evaluation      Multimodal analgesia: multimodal analgesia not used between 6 hours prior to anesthesia start to PACU discharge  Patient location during evaluation: PACU  Patient participation: complete - patient participated  Level of consciousness: awake and alert  Pain management: adequate  Airway patency: patent  Anesthetic complications: no  Cardiovascular status: acceptable  Respiratory status: acceptable  Hydration status: acceptable  Post anesthesia nausea and vomiting:  none  Final Post Anesthesia Temperature Assessment:  Normothermia (36.0-37.5 degrees C)      INITIAL Post-op Vital signs:   Vitals Value Taken Time   /80 07/20/21 1015   Temp 36.8 °C (98.3 °F) 07/20/21 1012   Pulse 73 07/20/21 1015   Resp 18 07/20/21 1015   SpO2 97 % 07/20/21 1015

## 2022-02-15 RX ORDER — BACLOFEN 10 MG/1
TABLET ORAL
Qty: 270 TABLET | Refills: 4 | Status: SHIPPED | OUTPATIENT
Start: 2022-02-15

## 2023-05-16 RX ORDER — TAMSULOSIN HYDROCHLORIDE 0.4 MG/1
0.4 CAPSULE ORAL DAILY
COMMUNITY

## 2023-05-16 RX ORDER — PREDNISONE 5 MG/1
TABLET ORAL 2 TIMES DAILY
COMMUNITY
Start: 2019-04-22

## 2023-05-16 RX ORDER — LABETALOL 300 MG/1
TABLET, FILM COATED ORAL 2 TIMES DAILY
COMMUNITY

## 2023-05-16 RX ORDER — LISINOPRIL 20 MG/1
20 TABLET ORAL DAILY
COMMUNITY

## 2023-05-16 RX ORDER — DEXAMETHASONE 4 MG/1
1 TABLET ORAL 2 TIMES DAILY WITH MEALS
COMMUNITY
Start: 2020-09-18

## 2023-05-16 RX ORDER — LOVASTATIN 20 MG/1
20 TABLET ORAL NIGHTLY
COMMUNITY

## 2023-05-16 RX ORDER — BACLOFEN 10 MG/1
1 TABLET ORAL 3 TIMES DAILY
COMMUNITY
Start: 2022-02-15

## 2023-05-16 RX ORDER — GLIPIZIDE 5 MG/1
TABLET ORAL 2 TIMES DAILY
COMMUNITY

## 2023-05-16 RX ORDER — METFORMIN HYDROCHLORIDE 500 MG/1
TABLET, EXTENDED RELEASE ORAL 2 TIMES DAILY
COMMUNITY

## 2023-05-16 RX ORDER — AMLODIPINE BESYLATE 5 MG/1
5 TABLET ORAL DAILY
COMMUNITY

## 2023-05-16 RX ORDER — TRIAMTERENE AND HYDROCHLOROTHIAZIDE 37.5; 25 MG/1; MG/1
1 TABLET ORAL DAILY
COMMUNITY

## 2023-06-23 ENCOUNTER — HOSPITAL ENCOUNTER (OUTPATIENT)
Facility: HOSPITAL | Age: 73
End: 2023-06-23
Payer: MEDICARE

## 2023-06-23 DIAGNOSIS — M79.645 PAIN OF LEFT THUMB: ICD-10-CM

## 2023-06-23 PROCEDURE — 73140 X-RAY EXAM OF FINGER(S): CPT

## 2025-06-10 ENCOUNTER — TRANSCRIBE ORDERS (OUTPATIENT)
Facility: HOSPITAL | Age: 75
End: 2025-06-10

## 2025-06-10 DIAGNOSIS — K86.1 CHRONIC PANCREATITIS, UNSPECIFIED PANCREATITIS TYPE (HCC): Primary | ICD-10-CM

## 2025-06-10 DIAGNOSIS — R11.2 NAUSEA AND VOMITING, UNSPECIFIED VOMITING TYPE: ICD-10-CM

## (undated) DEVICE — NEEDLE HYPO 18GA L1.5IN PNK S STL HUB POLYPR SHLD REG BVL

## (undated) DEVICE — Z DISCONTINUED NO SUB IDED SET EXTN W/ 4 W STPCOCK M SPIN LOK 36IN

## (undated) DEVICE — BAG SPEC BIOHZRD 10 X 10 IN --

## (undated) DEVICE — FORCEPS BX L160CM DIA8MM GRSP DISECT CUP TIP NONLOCKING ROT

## (undated) DEVICE — BAG BELONG PT PERS CLEAR HANDL

## (undated) DEVICE — CANNULA CUSH AD W/ 14FT TBG

## (undated) DEVICE — KENDALL RADIOLUCENT FOAM MONITORING ELECTRODE -RECTANGULAR SHAPE: Brand: KENDALL

## (undated) DEVICE — TOWEL 4 PLY TISS 19X30 SUE WHT

## (undated) DEVICE — SOLIDIFIER FLUID 3000 CC ABSORB

## (undated) DEVICE — BAG SPEC BIOHZD LF 2MIL 6X10IN -- CONVERT TO ITEM 357326

## (undated) DEVICE — SOLIDIFIER MEDC 1200ML -- CONVERT TO 356117

## (undated) DEVICE — TRAP SURG QUAD PARABOLA SLOT DSGN SFTY SCRN TRAPEASE

## (undated) DEVICE — CONTAINER SPEC 20 ML LID NEUT BUFF FORMALIN 10 % POLYPR STS

## (undated) DEVICE — FCPS BX HOT RJ4 2.2MMX240CM -- RADIAL JAW 4 BX/40

## (undated) DEVICE — SET ADMIN 16ML TBNG L100IN 2 Y INJ SITE IV PIGGY BK DISP

## (undated) DEVICE — ENDO CARRY-ON PROCEDURE KIT INCLUDES ENZYMATIC SPONGE, GAUZE, BIOHAZARD LABEL, TRAY, LUBRICANT, DIRTY SCOPE LABEL, WATER LABEL, TRAY, DRAWSTRING PAD, AND DEFENDO 4-PIECE KIT.: Brand: ENDO CARRY-ON PROCEDURE KIT

## (undated) DEVICE — Z DISCONTINUED USE 2751540 TUBING IRRIG L10IN DISP PMP ENDOGATOR

## (undated) DEVICE — CATH IV AUTOGRD BC BLU 22GA 25 -- INSYTE

## (undated) DEVICE — KIT IV STRT W CHLORAPREP PD 1ML

## (undated) DEVICE — 1200 GUARD II KIT W/5MM TUBE W/O VAC TUBE: Brand: GUARDIAN

## (undated) DEVICE — REM POLYHESIVE ADULT PATIENT RETURN ELECTRODE: Brand: VALLEYLAB

## (undated) DEVICE — NEONATAL-ADULT SPO2 SENSOR: Brand: NELLCOR

## (undated) DEVICE — BLOCK BITE ENDOSCP AD 21 MM W/ DIL BLU LF DISP

## (undated) DEVICE — ELECTRODE,RADIOTRANSLUCENT,FOAM,5PK: Brand: MEDLINE

## (undated) DEVICE — AIRLIFE™ U/CONNECT-IT OXYGEN TUBING 7 FEET (2.1 M) CRUSH-RESISTANT OXYGEN TUBING, VINYL TIPPED: Brand: AIRLIFE™

## (undated) DEVICE — YANKAUER,TAPERED BULBOUS TIP,W/O VENT: Brand: MEDLINE

## (undated) DEVICE — Z CONVERTED USE 2274299 CUFF BLD PRESSURE LNG MED AD 25-35 CM ARM FLEXIPORT DISP

## (undated) DEVICE — SYRINGE MED 20ML STD CLR PLAS LUERLOCK TIP N CTRL DISP

## (undated) DEVICE — CONNECTOR TBNG AUX H2O JET DISP FOR OLY 160/180 SER

## (undated) DEVICE — QUILTED PREMIUM COMFORT UNDERPAD,EXTRA HEAVY: Brand: WINGS

## (undated) DEVICE — KIT,1200CC CANISTER,3/16"X6' TUBING: Brand: MEDLINE INDUSTRIES, INC.

## (undated) DEVICE — BW-412T DISP COMBO CLEANING BRUSH: Brand: SINGLE USE COMBINATION CLEANING BRUSH